# Patient Record
Sex: MALE | Race: WHITE | NOT HISPANIC OR LATINO | Employment: OTHER | URBAN - NONMETROPOLITAN AREA
[De-identification: names, ages, dates, MRNs, and addresses within clinical notes are randomized per-mention and may not be internally consistent; named-entity substitution may affect disease eponyms.]

---

## 2018-10-22 ENCOUNTER — OFFICE VISIT CONVERTED (OUTPATIENT)
Dept: FAMILY MEDICINE CLINIC | Age: 78
End: 2018-10-22
Attending: FAMILY MEDICINE

## 2018-11-06 ENCOUNTER — OFFICE VISIT CONVERTED (OUTPATIENT)
Dept: FAMILY MEDICINE CLINIC | Age: 78
End: 2018-11-06
Attending: FAMILY MEDICINE

## 2018-12-18 ENCOUNTER — OFFICE VISIT CONVERTED (OUTPATIENT)
Dept: FAMILY MEDICINE CLINIC | Age: 78
End: 2018-12-18
Attending: FAMILY MEDICINE

## 2019-01-08 ENCOUNTER — OFFICE VISIT CONVERTED (OUTPATIENT)
Dept: FAMILY MEDICINE CLINIC | Age: 79
End: 2019-01-08
Attending: FAMILY MEDICINE

## 2019-01-28 ENCOUNTER — CONVERSION ENCOUNTER (OUTPATIENT)
Dept: SURGERY | Facility: CLINIC | Age: 79
End: 2019-01-28

## 2019-01-28 ENCOUNTER — OFFICE VISIT CONVERTED (OUTPATIENT)
Dept: SURGERY | Facility: CLINIC | Age: 79
End: 2019-01-28
Attending: UROLOGY

## 2019-02-01 ENCOUNTER — HOSPITAL ENCOUNTER (OUTPATIENT)
Dept: OTHER | Facility: HOSPITAL | Age: 79
Discharge: HOME | End: 2019-02-01

## 2019-02-01 LAB
CREAT BLD-MCNC: 1.6 MG/DL (ref 0.6–1.4)
GFR SERPLBLD BASED ON 1.73 SQ M-ARVRAT: 40 ML/MIN/{1.73_M2}

## 2019-02-02 LAB
ANION GAP SERPL CALC-SCNC: 14 MMOL/L (ref 8–19)
BUN SERPL-MCNC: 13 MG/DL (ref 5–25)
BUN/CREAT SERPL: 10 {RATIO} (ref 6–20)
CALCIUM SERPL-MCNC: 9.3 MG/DL (ref 8.7–10.4)
CHLORIDE SERPL-SCNC: 104 MMOL/L (ref 99–111)
CONV CO2: 27 MMOL/L (ref 22–32)
CREAT UR-MCNC: 1.33 MG/DL (ref 0.7–1.2)
GFR SERPLBLD BASED ON 1.73 SQ M-ARVRAT: 50 ML/MIN/{1.73_M2}
GLUCOSE SERPL-MCNC: 90 MG/DL (ref 70–99)
OSMOLALITY SERPL CALC.SUM OF ELEC: 292 MOSM/KG (ref 273–304)
POTASSIUM SERPL-SCNC: 4.2 MMOL/L (ref 3.5–5.3)
SODIUM SERPL-SCNC: 141 MMOL/L (ref 135–147)

## 2019-02-08 ENCOUNTER — OFFICE VISIT CONVERTED (OUTPATIENT)
Dept: SURGERY | Facility: CLINIC | Age: 79
End: 2019-02-08
Attending: UROLOGY

## 2019-07-05 ENCOUNTER — OFFICE VISIT CONVERTED (OUTPATIENT)
Dept: FAMILY MEDICINE CLINIC | Age: 79
End: 2019-07-05
Attending: NURSE PRACTITIONER

## 2019-07-12 ENCOUNTER — HOSPITAL ENCOUNTER (OUTPATIENT)
Dept: OTHER | Facility: HOSPITAL | Age: 79
Discharge: HOME | End: 2019-07-12
Attending: FAMILY MEDICINE

## 2019-07-12 ENCOUNTER — OFFICE VISIT CONVERTED (OUTPATIENT)
Dept: FAMILY MEDICINE CLINIC | Age: 79
End: 2019-07-12
Attending: FAMILY MEDICINE

## 2019-07-12 LAB
ALBUMIN SERPL-MCNC: 3.9 G/DL (ref 3.5–5)
ALBUMIN/GLOB SERPL: 1.4 {RATIO} (ref 1.4–2.6)
ALP SERPL-CCNC: 73 U/L (ref 56–155)
ALT SERPL-CCNC: 8 U/L (ref 10–40)
AMYLASE SERPL-CCNC: 211 U/L (ref 30–150)
ANION GAP SERPL CALC-SCNC: 20 MMOL/L (ref 8–19)
APPEARANCE UR: CLEAR
AST SERPL-CCNC: 14 U/L (ref 15–50)
BACTERIA UR QL AUTO: NORMAL
BASOPHILS # BLD MANUAL: 0.07 10*3/UL (ref 0–0.2)
BASOPHILS NFR BLD MANUAL: 1.3 % (ref 0–3)
BILIRUB SERPL-MCNC: 0.32 MG/DL (ref 0.2–1.3)
BILIRUB UR QL: NEGATIVE
BUN SERPL-MCNC: 11 MG/DL (ref 5–25)
BUN/CREAT SERPL: 8 {RATIO} (ref 6–20)
CALCIUM SERPL-MCNC: 9.1 MG/DL (ref 8.7–10.4)
CASTS URNS QL MICRO: NORMAL /[LPF]
CHLORIDE SERPL-SCNC: 101 MMOL/L (ref 99–111)
COLOR UR: YELLOW
CONV CO2: 23 MMOL/L (ref 22–32)
CONV LEUKOCYTE ESTERASE: NEGATIVE
CONV TOTAL PROTEIN: 6.6 G/DL (ref 6.3–8.2)
CONV UROBILINOGEN IN URINE BY AUTOMATED TEST STRIP: 0.2 {EHRLICHU}/DL (ref 0.1–1)
CREAT BLD-MCNC: 1.4 MG/DL (ref 0.6–1.4)
CREAT UR-MCNC: 1.34 MG/DL (ref 0.7–1.2)
DEPRECATED RDW RBC AUTO: 40 FL
EOSINOPHIL # BLD MANUAL: 0.05 10*3/UL (ref 0–0.7)
EOSINOPHIL NFR BLD MANUAL: 0.9 % (ref 0–7)
EPI CELLS #/AREA URNS HPF: NORMAL /[HPF]
ERYTHROCYTE [DISTWIDTH] IN BLOOD BY AUTOMATED COUNT: 12 % (ref 11.5–14.5)
GFR SERPLBLD BASED ON 1.73 SQ M-ARVRAT: 47 ML/MIN/{1.73_M2}
GFR SERPLBLD BASED ON 1.73 SQ M-ARVRAT: 50 ML/MIN/{1.73_M2}
GLOBULIN UR ELPH-MCNC: 2.7 G/DL (ref 2–3.5)
GLUCOSE 24H UR-MCNC: NEGATIVE MG/DL
GLUCOSE SERPL-MCNC: 90 MG/DL (ref 70–99)
GRANS (ABSOLUTE): 3.32 10*3/UL (ref 2–8)
GRANS: 60.5 % (ref 30–85)
HBA1C MFR BLD: 12.6 G/DL (ref 14–18)
HCT VFR BLD AUTO: 38.5 % (ref 42–52)
HGB UR QL STRIP: NEGATIVE
IMM GRANULOCYTES # BLD: 0.01 10*3/UL (ref 0–0.54)
IMM GRANULOCYTES NFR BLD: 0.2 % (ref 0–0.43)
KETONES UR QL STRIP: NEGATIVE MG/DL
LYMPHOCYTES # BLD MANUAL: 1.62 10*3/UL (ref 1–5)
LYMPHOCYTES NFR BLD MANUAL: 7.5 % (ref 3–10)
MCH RBC QN AUTO: 29.5 PG (ref 27–31)
MCHC RBC AUTO-ENTMCNC: 32.7 G/DL (ref 33–37)
MCV RBC AUTO: 90.2 FL (ref 80–96)
MONOCYTES # BLD AUTO: 0.41 10*3/UL (ref 0.2–1.2)
MUCOUS THREADS URNS QL MICRO: NORMAL
NITRITE UR-MCNC: NEGATIVE MG/ML
OSMOLALITY SERPL CALC.SUM OF ELEC: 289 MOSM/KG (ref 273–304)
PH UR STRIP.AUTO: 7.5 [PH] (ref 5–8)
PLATELET # BLD AUTO: 201 10*3/UL (ref 130–400)
PMV BLD AUTO: 10.5 FL (ref 7.4–10.4)
POTASSIUM SERPL-SCNC: 4.3 MMOL/L (ref 3.5–5.3)
PROT UR-MCNC: NEGATIVE MG/DL
PSA SERPL-MCNC: 2.57 NG/ML (ref 0–4)
RBC # BLD AUTO: 4.27 10*6/UL (ref 4.7–6.1)
RBC # BLD AUTO: NORMAL /[HPF]
SODIUM SERPL-SCNC: 140 MMOL/L (ref 135–147)
SP GR UR STRIP: 1.01 (ref 1–1.03)
SPECIMEN SOURCE: NORMAL
UNIDENT CRYS URNS QL MICRO: NORMAL /[HPF]
VARIANT LYMPHS NFR BLD MANUAL: 29.6 % (ref 20–45)
WBC # BLD AUTO: 5.48 10*3/UL (ref 4.8–10.8)
WBC #/AREA URNS HPF: NORMAL /[HPF]

## 2020-01-14 ENCOUNTER — OFFICE VISIT CONVERTED (OUTPATIENT)
Dept: FAMILY MEDICINE CLINIC | Age: 80
End: 2020-01-14
Attending: FAMILY MEDICINE

## 2020-01-14 ENCOUNTER — HOSPITAL ENCOUNTER (OUTPATIENT)
Dept: OTHER | Facility: HOSPITAL | Age: 80
Discharge: HOME | End: 2020-01-14
Attending: FAMILY MEDICINE

## 2020-01-14 LAB
ALBUMIN SERPL-MCNC: 3.9 G/DL (ref 3.5–5)
ALBUMIN/GLOB SERPL: 1.5 {RATIO} (ref 1.4–2.6)
ALP SERPL-CCNC: 66 U/L (ref 56–155)
ALT SERPL-CCNC: 9 U/L (ref 10–40)
ANION GAP SERPL CALC-SCNC: 14 MMOL/L (ref 8–19)
AST SERPL-CCNC: 14 U/L (ref 15–50)
BILIRUB SERPL-MCNC: 0.35 MG/DL (ref 0.2–1.3)
BUN SERPL-MCNC: 13 MG/DL (ref 5–25)
BUN/CREAT SERPL: 10 {RATIO} (ref 6–20)
CALCIUM SERPL-MCNC: 9.4 MG/DL (ref 8.7–10.4)
CHLORIDE SERPL-SCNC: 103 MMOL/L (ref 99–111)
CONV CO2: 27 MMOL/L (ref 22–32)
CONV TOTAL PROTEIN: 6.5 G/DL (ref 6.3–8.2)
CREAT UR-MCNC: 1.34 MG/DL (ref 0.7–1.2)
ERYTHROCYTE [DISTWIDTH] IN BLOOD BY AUTOMATED COUNT: 12.4 % (ref 11.5–14.5)
FERRITIN SERPL-MCNC: 235 NG/ML (ref 30–300)
FOLATE SERPL-MCNC: 7.4 NG/ML (ref 4.8–20)
GFR SERPLBLD BASED ON 1.73 SQ M-ARVRAT: 50 ML/MIN/{1.73_M2}
GLOBULIN UR ELPH-MCNC: 2.6 G/DL (ref 2–3.5)
GLUCOSE SERPL-MCNC: 94 MG/DL (ref 70–99)
HBA1C MFR BLD: 12.3 G/DL (ref 14–18)
HCT VFR BLD AUTO: 38.2 % (ref 42–52)
IRON SERPL-MCNC: 84 UG/DL (ref 70–180)
MCH RBC QN AUTO: 29.9 PG (ref 27–31)
MCHC RBC AUTO-ENTMCNC: 32.2 G/DL (ref 33–37)
MCV RBC AUTO: 92.7 FL (ref 80–96)
OSMOLALITY SERPL CALC.SUM OF ELEC: 290 MOSM/KG (ref 273–304)
PLATELET # BLD AUTO: 215 10*3/UL (ref 130–400)
PMV BLD AUTO: 10.1 FL (ref 7.4–10.4)
POTASSIUM SERPL-SCNC: 4.2 MMOL/L (ref 3.5–5.3)
RBC # BLD AUTO: 4.12 10*6/UL (ref 4.7–6.1)
SODIUM SERPL-SCNC: 140 MMOL/L (ref 135–147)
VIT B12 SERPL-MCNC: 686 PG/ML (ref 211–911)
WBC # BLD AUTO: 5.19 10*3/UL (ref 4.8–10.8)

## 2020-11-24 ENCOUNTER — OFFICE VISIT CONVERTED (OUTPATIENT)
Dept: FAMILY MEDICINE CLINIC | Age: 80
End: 2020-11-24
Attending: FAMILY MEDICINE

## 2020-11-24 ENCOUNTER — HOSPITAL ENCOUNTER (OUTPATIENT)
Dept: OTHER | Facility: HOSPITAL | Age: 80
Discharge: HOME | End: 2020-11-24
Attending: FAMILY MEDICINE

## 2020-11-24 LAB
ERYTHROCYTE [DISTWIDTH] IN BLOOD BY AUTOMATED COUNT: 12.1 % (ref 11.5–14.5)
HBA1C MFR BLD: 12.2 G/DL (ref 14–18)
HCT VFR BLD AUTO: 37.6 % (ref 42–52)
MCH RBC QN AUTO: 30 PG (ref 27–31)
MCHC RBC AUTO-ENTMCNC: 32.4 G/DL (ref 33–37)
MCV RBC AUTO: 92.4 FL (ref 80–96)
PLATELET # BLD AUTO: 203 10*3/UL (ref 130–400)
PMV BLD AUTO: 10 FL (ref 7.4–10.4)
RBC # BLD AUTO: 4.07 10*6/UL (ref 4.7–6.1)
WBC # BLD AUTO: 5.75 10*3/UL (ref 4.8–10.8)

## 2020-11-24 PROCEDURE — 82746 ASSAY OF FOLIC ACID SERUM: CPT

## 2020-11-25 LAB
ALBUMIN SERPL-MCNC: 3.9 G/DL (ref 3.5–5)
ALBUMIN/GLOB SERPL: 1.4 {RATIO} (ref 1.4–2.6)
ALP SERPL-CCNC: 79 U/L (ref 56–155)
ALT SERPL-CCNC: 8 U/L (ref 10–40)
ANION GAP SERPL CALC-SCNC: 13 MMOL/L (ref 8–19)
AST SERPL-CCNC: 15 U/L (ref 15–50)
BILIRUB SERPL-MCNC: 0.34 MG/DL (ref 0.2–1.3)
BUN SERPL-MCNC: 12 MG/DL (ref 5–25)
BUN/CREAT SERPL: 8 {RATIO} (ref 6–20)
CALCIUM SERPL-MCNC: 9.2 MG/DL (ref 8.7–10.4)
CHLORIDE SERPL-SCNC: 99 MMOL/L (ref 99–111)
CONV CO2: 27 MMOL/L (ref 22–32)
CONV TOTAL PROTEIN: 6.6 G/DL (ref 6.3–8.2)
CREAT UR-MCNC: 1.52 MG/DL (ref 0.7–1.2)
GFR SERPLBLD BASED ON 1.73 SQ M-ARVRAT: 42 ML/MIN/{1.73_M2}
GLOBULIN UR ELPH-MCNC: 2.7 G/DL (ref 2–3.5)
GLUCOSE SERPL-MCNC: 89 MG/DL (ref 70–99)
OSMOLALITY SERPL CALC.SUM OF ELEC: 279 MOSM/KG (ref 273–304)
POTASSIUM SERPL-SCNC: 3.7 MMOL/L (ref 3.5–5.3)
SODIUM SERPL-SCNC: 135 MMOL/L (ref 135–147)
VIT B12 SERPL-MCNC: 605 PG/ML (ref 211–911)

## 2020-11-26 ENCOUNTER — LAB REQUISITION (OUTPATIENT)
Dept: LAB | Facility: HOSPITAL | Age: 80
End: 2020-11-26

## 2020-11-26 DIAGNOSIS — Z00.00 ROUTINE GENERAL MEDICAL EXAMINATION AT A HEALTH CARE FACILITY: ICD-10-CM

## 2020-11-26 LAB — FOLATE SERPL-MCNC: 7.98 NG/ML (ref 4.78–24.2)

## 2021-03-03 ENCOUNTER — HOSPITAL ENCOUNTER (OUTPATIENT)
Dept: OTHER | Facility: HOSPITAL | Age: 81
Discharge: HOME | End: 2021-03-03
Attending: FAMILY MEDICINE

## 2021-03-03 LAB
ANION GAP SERPL CALC-SCNC: 13 MMOL/L (ref 8–19)
APPEARANCE UR: ABNORMAL
BILIRUB UR QL: NEGATIVE
BUN SERPL-MCNC: 12 MG/DL (ref 5–25)
BUN/CREAT SERPL: 8 {RATIO} (ref 6–20)
CALCIUM SERPL-MCNC: 9.1 MG/DL (ref 8.7–10.4)
CHLORIDE SERPL-SCNC: 106 MMOL/L (ref 99–111)
COLOR UR: YELLOW
CONV BACTERIA: NEGATIVE
CONV CO2: 26 MMOL/L (ref 22–32)
CONV COLLECTION SOURCE (UA): ABNORMAL
CONV HYALINE CASTS IN URINE MICRO: ABNORMAL /[LPF]
CONV UROBILINOGEN IN URINE BY AUTOMATED TEST STRIP: 0.2 {EHRLICHU}/DL (ref 0.1–1)
CREAT UR-MCNC: 1.52 MG/DL (ref 0.7–1.2)
ERYTHROCYTE [DISTWIDTH] IN BLOOD BY AUTOMATED COUNT: 12.5 % (ref 11.5–14.5)
FOLATE SERPL-MCNC: 13.2 NG/ML (ref 4.8–20)
GFR SERPLBLD BASED ON 1.73 SQ M-ARVRAT: 42 ML/MIN/{1.73_M2}
GLUCOSE SERPL-MCNC: 73 MG/DL (ref 70–99)
GLUCOSE UR QL: NEGATIVE MG/DL
HBA1C MFR BLD: 12.1 G/DL (ref 14–18)
HCT VFR BLD AUTO: 37.3 % (ref 42–52)
HGB UR QL STRIP: NEGATIVE
KETONES UR QL STRIP: NEGATIVE MG/DL
LEUKOCYTE ESTERASE UR QL STRIP: NEGATIVE
MCH RBC QN AUTO: 30.4 PG (ref 27–31)
MCHC RBC AUTO-ENTMCNC: 32.4 G/DL (ref 33–37)
MCV RBC AUTO: 93.7 FL (ref 80–96)
NITRITE UR QL STRIP: NEGATIVE
OSMOLALITY SERPL CALC.SUM OF ELEC: 290 MOSM/KG (ref 273–304)
PH UR STRIP.AUTO: 5.5 [PH] (ref 5–8)
PLATELET # BLD AUTO: 185 10*3/UL (ref 130–400)
PMV BLD AUTO: 10 FL (ref 7.4–10.4)
POTASSIUM SERPL-SCNC: 3.8 MMOL/L (ref 3.5–5.3)
PROT UR QL: ABNORMAL MG/DL
RBC # BLD AUTO: 3.98 10*6/UL (ref 4.7–6.1)
RBC #/AREA URNS HPF: ABNORMAL /[HPF]
SODIUM SERPL-SCNC: 141 MMOL/L (ref 135–147)
SP GR UR: 1.02 (ref 1–1.03)
VIT B12 SERPL-MCNC: 358 PG/ML (ref 211–911)
WBC # BLD AUTO: 5.28 10*3/UL (ref 4.8–10.8)
WBC #/AREA URNS HPF: ABNORMAL /[HPF]

## 2021-03-16 ENCOUNTER — HOSPITAL ENCOUNTER (OUTPATIENT)
Dept: OTHER | Facility: HOSPITAL | Age: 81
Discharge: HOME | End: 2021-03-16
Attending: FAMILY MEDICINE

## 2021-05-16 VITALS — WEIGHT: 145 LBS | HEIGHT: 70 IN | RESPIRATION RATE: 12 BRPM | BODY MASS INDEX: 20.76 KG/M2

## 2021-05-16 VITALS — RESPIRATION RATE: 12 BRPM | HEIGHT: 70 IN | BODY MASS INDEX: 20.78 KG/M2 | WEIGHT: 145.12 LBS

## 2021-05-18 NOTE — PROGRESS NOTES
Teofilo Gomez 1940     Office/Outpatient Visit    Visit Date: Mon, Oct 22, 2018 10:42 am    Provider: Mohinder Hutchison MD (Assistant: Whit Curran)    Location: [facility]        Electronically signed by Mohinder Hutchison MD on  10/22/2018 05:28:44 PM                             SUBJECTIVE:        CC: questions about memory         HPI:     Francisco is in today for follow up on his memory.  He and his wife have noted some changes in his ability to recall things.  Specifically, he has missed some appointments because of his memory.  His wife has noted that he may forget to pick things up or do things that she may have asked him to do.  He has not had any difficulty driving.  He does continue to fly airplanes.  He has not had any problem paying bills.  His mother apparently did have 'a light case' of Alzheimer's disease.  Francisco does find that he is taking more notes and writes things down more than he used to.     ROS:     CONSTITUTIONAL:  Negative for chills and fever.      CARDIOVASCULAR:  Negative for chest pain and palpitations.      RESPIRATORY:  Negative for recent cough and dyspnea.      GASTROINTESTINAL:  Negative for abdominal pain, nausea and vomiting.      INTEGUMENTARY:  Negative for atypical mole(s) and rash.          [facility]/SH:     Last Reviewed on 10/22/2018 11:10 AM by Mohinder Hutchison    Past Medical History:             PAST MEDICAL HISTORY     UNREMARKABLE         Surgical History:         Tonsillectomy      R Shoulder/R Knee;         Family History:     Father:  at age early 80s     Mother:  at age late 80s         Social History:     Occupation: Retired (Prior occupation: self employed - )     Marital Status:      Children: 1 child         Tobacco/Alcohol/Supplements:     Last Reviewed on 10/22/2018 11:10 AM by Mohinder Hutchison    Tobacco: He has never smoked.          Alcohol:  Does not drink alcohol and never has.          Substance  Abuse History:     Last Reviewed on 10/22/2018 11:10 AM by Mohinder Hutchison        Mental Health History:     Last Reviewed on 10/22/2018 11:10 AM by Mohinder Hutchison        Communicable Diseases (eg STDs):     Last Reviewed on 10/22/2018 11:10 AM by Mohinder Hutchison            Current Problems:     Last Reviewed on 10/22/2018 11:10 AM by Mohinder Hutchison      None Recorded         Immunizations:     None        Allergies:     Last Reviewed on 10/22/2018 11:10 AM by Mohinder Hutchison      No Known Drug Allergies.         Current Medications:     Last Reviewed on 10/22/2018 11:10 AM by Mohinder Hutchison    None        OBJECTIVE:        Vitals:         Current: 10/22/2018 10:46:58 AM    Ht:  5 ft, 8 in;  Wt: 146.8 lbs;  BMI: 22.3    T: 97.5 F (oral);  BP: 141/78 mm Hg (right arm, sitting);  P: 67 bpm (right arm (BP Cuff), sitting)        Exams:     PHYSICAL EXAM:     GENERAL: Vitals recorded well developed, well nourished;     EYES: extraocular movements intact; conjunctiva and cornea are normal; PERRL;     E/N/T: EARS:  normal external auditory canals and tympanic membranes;  grossly normal hearing; OROPHARYNX:  normal mucosa, dentition, gingiva, and posterior pharynx;     NECK: range of motion is normal; thyroid is non-palpable;     RESPIRATORY: normal respiratory rate and pattern with no distress; normal breath sounds with no rales, rhonchi, wheezes or rubs;     CARDIOVASCULAR: normal rate; rhythm is regular;  no systolic murmur;     GASTROINTESTINAL: nontender; normal bowel sounds; no masses;     LYMPHATIC: no enlargement of cervical or facial nodes; no supraclavicular nodes;     SKIN:  no significant rashes or lesions; no suspicious moles;     NEUROLOGIC: mental status: alert and oriented x 3; cranial nerves II-XII grossly intact; Mini-Mental State Exam score is 27 - all on short term recall     PSYCHIATRIC: appropriate affect and demeanor; normal psychomotor function;          ASSESSMENT           780.93   R41.89  Memory loss              DDx:         ORDERS:         Lab Orders:       05689  B12FO - [facility] Vitamin B12 with Folate  (Send-Out)         02530  BDCB2 - [facility] CBC w/o diff  (Send-Out)         38521  COMP - [facility] Comp. Metabolic Panel  (Send-Out)         65976  TSH - [facility] TSH  (Send-Out)                   PLAN:          Memory loss     LABORATORY:  Labs ordered to be performed today include B12 with Folate, CBC W/O DIFF, Comprehensive metabolic panel, and TSH.      RECOMMENDATIONS given include: Today, we have reviewed Francisco's care.  I'm concerned about the loss of memory that we have seen on testing and of which his wife is concerned.  We are going to evaluate him further as noted below.  I suspect his blood work will be normal.  The question will be whether to consider medication for him at some point.  I have given him a handout regarding dementia.  We will go from there.  No other near term changes are anticipated..            Orders:       81950  B12FO - [facility] Vitamin B12 with Folate  (Send-Out)         37378  BDCB2 - [facility] CBC w/o diff  (Send-Out)         51073  COMP - [facility] Comp. Metabolic Panel  (Send-Out)         97638  TSH - [facility] TSH  (Send-Out)             Patient Education Handouts:       Alzheimer's Disease              CHARGE CAPTURE           **Please note: ICD descriptions below are intended for billing purposes only and may not represent clinical diagnoses**        Primary Diagnosis:         780.93 Memory loss            R41.89    Other symptoms and signs involving cognitive functions and awareness              Orders:          24557   Office visit - new pt, level 3  (In-House)

## 2021-05-18 NOTE — PROGRESS NOTES
Teofilo Gomez 1940     Office/Outpatient Visit    Visit Date: Fri, Jul 5, 2019 04:10 pm    Provider: German Reyes N.P. (Assistant: Frida Magaña MA)    Location: [facility]        Electronically signed by German Reyes N.P. on  07/05/2019 06:27:42 PM                             SUBJECTIVE:        CC:     Frnacisco is a 78 year old White male.  presents today due to nausea and headache x 3 days         HPI:         PHQ-9 Depression Screening: Completed form scanned and in chart; Total Score 2 Alcohol Consumption Screening: Completed form scanned and in chart; Total Score 0         With regard to the headache, onset was 2 days ago.  The location is primarily occipital.  It does not radiate.  Francisco denies having significant prior headaches.  He characterizes it as moderate in severity and aching.  Associated symptoms include nausea.  He denies altered consciousness, confusion, fever, pain in teeth, phonophobia, photophobia, seizure, stiff neck, vision disturbance, vertigo or vomiting.  There do not seem to be any factors that worsen the headache.  It is improved with has not taken anything for the Headache, not even Tylenol or Ibuprofen.  Pertinent past medical history includes Anemia and Memory loss.  Denies change in LOC or vision. Did get new glasses last week [facility]      ROS:     CONSTITUTIONAL:  Negative for chills, fatigue and fever.      CARDIOVASCULAR:  Negative for chest pain, orthopnea, paroxysmal nocturnal dyspnea and pedal edema.      RESPIRATORY:  Negative for dyspnea and cough.      GASTROINTESTINAL:  Positive for nausea.   Negative for abdominal pain, acid reflux symptoms, constipation, diarrhea or vomiting.      NEUROLOGICAL:  Positive for headaches and memory loss.   Negative for dizziness, fainting, paresthesias, seizures, tremor, vertigo or weakness.      PSYCHIATRIC:  Negative for anxiety and depression.          PMH/FMH/SH:     Last Reviewed on 7/05/2019 04:59  PM by German Reyes    Past Medical History:             PAST MEDICAL HISTORY         Alzheimer's Disease         PREVENTIVE HEALTH MAINTENANCE             COLORECTAL CANCER SCREENING: Up to date (colonoscopy q10y; sigmoidoscopy q5y; Cologuard q3y) was last done 10/2016, Results are in chart; colonoscopy with the following abnormalities noted-- Diverticulosis         Surgical History:         Tonsillectomy      R Shoulder/R Knee;         Family History:     Father:  at age early 80s     Mother:  at age late 80s;  Alzheimer's Disease         Social History:     Occupation: Retired (Prior occupation: self employed - )     Marital Status:      Children: 1 child         Tobacco/Alcohol/Supplements:     Last Reviewed on 2019 04:59 PM by German Reyes    Tobacco: He has never smoked.          Alcohol:  Does not drink alcohol and never has.          Substance Abuse History:     Last Reviewed on 2019 04:59 PM by German Reyes        Mental Health History:     Last Reviewed on 2019 04:59 PM by German Reyes        Communicable Diseases (eg STDs):     Last Reviewed on 2019 04:59 PM by German Reyes            Current Problems:     Last Reviewed on 2019 04:59 PM by German Reyes    Pernicious anemia     Memory loss     Headache     Screening for depression     Nonspecific abnormality on kidney function test         Immunizations:     None        Allergies:     Last Reviewed on 2019 04:59 PM by German Reyes      No Known Drug Allergies.         Current Medications:     Last Reviewed on 2019 04:59 PM by German Reyes    Polysaccharide Iron Complex 150mg Capsules Take 1 capsule(s) by mouth daily     Vitamin B12 1,000mcg/1ml Injection 1 cc weekly x 4 weeks then monthly     Donepezil HCl 10mg Tablet Take 1 tablet(s) by mouth daily         OBJECTIVE:        Vitals:         Current: 2019 4:15:25 PM    Ht:  5 ft, 8 in;  Wt:  146.8 lbs;  BMI: 22.3    T: 98.2 F (oral);  BP: 140/75 mm Hg (left arm, sitting);  P: 64 bpm (left arm (BP Cuff), sitting);  sCr: 1.45 mg/dL;  GFR: 36.29        Exams:     PHYSICAL EXAM:     GENERAL: Vitals recorded well developed, well nourished;  well groomed;  no apparent distress;     EYES: lids and lacrimal system are normal in appearance; extraocular movements intact; conjunctiva and cornea are normal; PERRLA;     E/N/T:  normal EACs, TMs, nasal/oral mucosa, teeth, gingiva, and oropharynx;     NECK: carotid exam is normal with good upstroke and no bruits;     RESPIRATORY: normal respiratory rate and pattern with no distress; normal breath sounds with no rales, rhonchi, wheezes or rubs;     CARDIOVASCULAR: normal rate; rhythm is regular;  normal S1; normal S2; no systolic murmur; no cyanosis; no edema;     GASTROINTESTINAL: nontender, nondistended; no hepatosplenomegaly or masses; no bruits;     SKIN:  no significant rashes or lesions; no suspicious moles;     MUSCULOSKELETAL:  Normal range of motion, strength and tone;     NEUROLOGIC: mental status: oriented to person and place only;  cranial nerves II-XII grossly intact; reflexes: knee jerks: 2+;  coordination/cerebellar: normal finger-to-nose;  normal heel-to-shin;  normal rapid alternating movements;  negative Romberg;     PSYCHIATRIC:  appropriate affect and demeanor; normal speech pattern; grossly normal memory;         Lab/Test Results:             BUN:  14 (mg/dl) (12/18/2018),     Creatinine, Serum:  1.45 (mg/dl) (12/18/2018),     Glom Filt Rate, Est:  45 (ml/min/1.73m2) (12/18/2018),             ASSESSMENT:           V79.0   Z13.89  Screening for depression              DDx:     784.0   B35.3  Headache              DDx:         ORDERS:         Meds Prescribed:       Ibuprofen 600mg Tablet Take 1 tablet(s) by mouth q 4 to 6 hr prn  #30 (Thirty) tablet(s) Refills: 0       Zofran (Ondansetron HCl) 4mg Tablet 1 po q 6 hours prn  #20 (Twenty) tablet(s)  Refills: 0         Radiology/Test Orders:       [id number]  Computed tomography, head or brain; without contrast material  (Send-Out)           Other Orders:         Depression screen negative  (In-House)           Negative EtOH screen  (In-House)                   PLAN:          Screening for depression     MIPS PHQ-9 Depression Screening: Completed form scanned and in chart; Total Score 2; Negative Depression Screen Negative alcohol screen           Orders:         Depression screen negative  (In-House)           Negative EtOH screen  (In-House)            Headache         FOLLOW-UP TESTING #1:    RADIOLOGY:  I have ordered a head CT w/out contrast to be done today.            Prescriptions:       Ibuprofen 600mg Tablet Take 1 tablet(s) by mouth q 4 to 6 hr prn  #30 (Thirty) tablet(s) Refills: 0       Zofran (Ondansetron HCl) 4mg Tablet 1 po q 6 hours prn  #20 (Twenty) tablet(s) Refills: 0           Orders:       77796  Computed tomography, head or brain; without contrast material  (Send-Out)   CT Head with and without contrast, new onset Headache. dmt             CHARGE CAPTURE:           Primary Diagnosis:     V79.0 Screening for depression            Z13.89    Encounter for screening for other disorder              Orders:          12762   Office/outpatient visit; established patient, level 4  (In-House)                Depression screen negative  (In-House)                Negative EtOH screen  (In-House)           784.0 Headache            B35.3    Tinea pedis        ADDENDUMS:      ____________________________________    Date: 07/12/2019 08:54 AM    Author: Melissa Rios         Radiology Orders Faxed to:             Date: 07/12/2019 08:54 AM    Author: Melissa Rios         Radiology Orders Faxed to:        [facility]; Number [phone]

## 2021-05-18 NOTE — PROGRESS NOTES
Teofilo Gomez 1940     Office/Outpatient Visit    Visit Date:  01:50 pm    Provider: Mhoinder Hutchison MD (Assistant: Zelda Serrano RN)    Location: [facility]        Electronically signed by Mohinder Hutchison MD on  2019 05:21:06 PM                             SUBJECTIVE:        CC: 'I'm wanting to find out about this driving course'         HPI:     Francisco is in today for follow up on his memory.  He did see neurology for evaluation of this and there was apparently a concern about whether he should continue to drive.  Neurology suggested he have additional evaluation on this.  Francisco feels like he is capable of driving.  However, he was told that he had to have this driving evaluation done in order to keep driving.         Francisco does have history of some low level anemia and was noted to have B12 deficiency and an iron level that was a little bit low.  Recent blood work is noted and again reviewed.  His hemoglobin improved from 12.2 to 13.0.          Recent blood work and renal ultrasound is noted and briefly reviewed with him.     ROS:     CONSTITUTIONAL:  Negative for chills and fever.      CARDIOVASCULAR:  Negative for chest pain and palpitations.      RESPIRATORY:  Negative for recent cough and dyspnea.      GASTROINTESTINAL:  Negative for abdominal pain, nausea and vomiting.          PM/[facility]/:     Last Reviewed on 2018 09:26 AM by Mohinder Hutchison    Past Medical History:             PAST MEDICAL HISTORY         Alzheimer's Disease         PREVENTIVE HEALTH MAINTENANCE             COLORECTAL CANCER SCREENING: Up to date (colonoscopy q10y; sigmoidoscopy q5y; Cologuard q3y) was last done 10/2016, Results are in chart; colonoscopy with the following abnormalities noted-- Diverticulosis         Surgical History:         Tonsillectomy      R Shoulder/R Knee;         Family History:     Father:  at age early 80s     Mother:  at age late 80s;   Alzheimer's Disease         Social History:     Occupation: Retired (Prior occupation: self employed - )     Marital Status:      Children: 1 child         Tobacco/Alcohol/Supplements:     Last Reviewed on 12/18/2018 09:26 AM by Mohinder Hutchison    Tobacco: He has never smoked.          Alcohol:  Does not drink alcohol and never has.          Substance Abuse History:     Last Reviewed on 12/18/2018 09:26 AM by Mohinder Hutchison        Mental Health History:     Last Reviewed on 12/18/2018 09:26 AM by Mohinder Hutchison        Communicable Diseases (eg STDs):     Last Reviewed on 12/18/2018 09:26 AM by Mohinder Hutchison            Current Problems:     Last Reviewed on 12/18/2018 09:26 AM by Mohinder Hutchison    Pernicious anemia     Memory loss     Nonspecific abnormality on kidney function test         Immunizations:     None        Allergies:     Last Reviewed on 1/08/2019 01:53 PM by Zelda Serrano      No Known Drug Allergies.         Current Medications:     Last Reviewed on 1/08/2019 01:53 PM by Zelda Serrano    Vitamin B12 1,000mcg/1ml Injection 1 cc weekly x 4 weeks then monthly     Polysaccharide Iron Complex 150mg Capsules Take 1 capsule(s) by mouth daily     Donepezil HCl 5mg Tablet Take 1 tablet(s) by mouth at bedtime         OBJECTIVE:        Vitals:         Current: 1/8/2019 1:56:19 PM    Ht:  5 ft, 8 in;  Wt: 143.6 lbs;  BMI: 21.8    T: 97.6 F (oral);  BP: 133/74 mm Hg (left arm, sitting);  P: 64 bpm (left arm (BP Cuff), sitting);  sCr: 1.45 mg/dL;  GFR: 35.96        Exams:     PHYSICAL EXAM:     GENERAL: Vitals recorded well developed, well nourished;     EYES: extraocular movements intact; conjunctiva and cornea are normal; PERRL;     E/N/T: EARS:  normal external auditory canals and tympanic membranes;  grossly normal hearing; OROPHARYNX:  normal mucosa, dentition, gingiva, and posterior pharynx;     NECK: range of motion is normal; thyroid is  non-palpable;     RESPIRATORY: normal respiratory rate and pattern with no distress; normal breath sounds with no rales, rhonchi, wheezes or rubs;     CARDIOVASCULAR: normal rate; rhythm is regular;  no systolic murmur;     GASTROINTESTINAL: nontender; normal bowel sounds; no masses;     SKIN:  no significant rashes or lesions; no suspicious moles;     NEUROLOGIC: mental status: alert;  cranial nerves II-XII grossly intact;         ASSESSMENT           780.93   R41.89  Memory loss              DDx:     281.0   D51.0  Pernicious anemia              DDx:     794.4   R94.4  Nonspecific abnormality on kidney function test              DDx:         ORDERS:         Meds Prescribed:       Refill of: Polysaccharide Iron Complex 150mg Capsules Take 1 capsule(s) by mouth daily  #90 (Ninety) capsule(s) Refills: 1         Radiology/Test Orders:       3017F  Colorectal CA screen results documented and reviewed (PV)  (In-House)                   PLAN:          Memory loss         RECOMMENDATIONS given include: Today, we have again reviewed Francisco's care.  We will contact Dr. Grullon's office regarding the memory and the recommendation regarding a driving evaluation.  I do think continuing the donepezil is reasonable.  Francisco is frustrated about the driving issue though, and I have told him that we will follow up regarding this and whether the test is necessary.  I suspect it is based on what they are telling me.  With regard to the anemia, I am not recommending any changes for now..  MIPS Vaccines Flu and Pneumonia updated in Shot record     COLORECTAL CANCER SCREENING: Results are in chart           Orders:       3017F  Colorectal CA screen results documented and reviewed (PV)  (In-House)             Patient Education Handouts:       Alzheimer's Disease           Pernicious anemia As above.           Prescriptions:       Refill of: Polysaccharide Iron Complex 150mg Capsules Take 1 capsule(s) by mouth daily  #90 (Ninety)  capsule(s) Refills: 1          Nonspecific abnormality on kidney function test As above.  See urology as scheduled.             CHARGE CAPTURE           **Please note: ICD descriptions below are intended for billing purposes only and may not represent clinical diagnoses**        Primary Diagnosis:         780.93 Memory loss            R41.89    Other symptoms and signs involving cognitive functions and awareness              Orders:          86818   Office/outpatient visit; established patient, level 4  (In-House)             3017F   Colorectal CA screen results documented and reviewed (PV)  (In-House)           281.0 Pernicious anemia            D51.0    Vitamin B12 deficiency anemia due to intrinsic factor deficiency    794.4 Nonspecific abnormality on kidney function test            R94.4    Abnormal results of kidney function studies        ADDENDUMS:      ____________________________________    Addendum: 01/09/2019 09:51 AM - Mohinder Hutchison        Please let Francisco's wife know that I do recommend he have the driving evaluation that was recommended by Dr. Grullon.  As we discussed yesterday, Alzheimer's disease can lead to confusion while driving that can be dangerous to them and to others.  The folks at [facility] apparently have a detailed evaluation they do to reassure us and them that Francisco is or is not safe to drive.  Thanks.        Addendum: 01/09/2019 09:57 AM - Four, Team        pt's wife inf, states they will comply/th        Addendum: 01/09/2019 09:58 AM - Mohinder Hutchison        Noted.

## 2021-05-18 NOTE — PROGRESS NOTES
Teofilo Gomez 1940     Office/Outpatient Visit    Visit Date:  08:18 am    Provider: Mohinder Hutchison MD     Location: [facility]        Electronically signed by Mohinder Hutchison MD on  2018 05:24:55 PM                             SUBJECTIVE:        CC: memory loss, B12 deficiency         HPI:     Francisco is in today with his wife, Sara, for follow up on his memory.  Please see his most recent visit note.  He was noted to score 27/30 on his MMSE.  He had poor short term memory retention.  His wife has noted some issue with this for the last couple of months at least.  Prior to that, he seemed to be doing okay.  The first thing they noted was an issue with not recording written checks in their account register.  They actually overdrew the account.  There have also been some issues with driving in regard to looking for certain things.  He has not had recent MRI of the brain.  Francisco has noted some issue with some tingling and numbness on the L side of his arm.  He has not had any issue with speech or swallowing.         Francisco's labs are also reviewed.  He was noted to have B12 deficiency.  He is on an oral supplement at this time.     ROS:     CONSTITUTIONAL:  Negative for chills and fever.      CARDIOVASCULAR:  Negative for chest pain and palpitations.      RESPIRATORY:  Negative for recent cough and dyspnea.      GASTROINTESTINAL:  Negative for abdominal pain, nausea and vomiting.      INTEGUMENTARY:  Negative for atypical mole(s) and rash.          [facility]/:     Last Reviewed on 10/22/2018 11:10 AM by Mohinder Hutchison    Past Medical History:             PAST MEDICAL HISTORY     UNREMARKABLE         Surgical History:         Tonsillectomy      R Shoulder/R Knee;         Family History:     Father:  at age early 80s     Mother:  at age late 80s         Social History:     Occupation: Retired (Prior occupation: self employed - )     Marital  Status:      Children: 1 child         Tobacco/Alcohol/Supplements:     Last Reviewed on 10/22/2018 11:10 AM by Mohinder Hutchison    Tobacco: He has never smoked.          Alcohol:  Does not drink alcohol and never has.          Substance Abuse History:     Last Reviewed on 10/22/2018 11:10 AM by Mohinder Hutchison        Mental Health History:     Last Reviewed on 10/22/2018 11:10 AM by Mohinder Hutchison        Communicable Diseases (eg STDs):     Last Reviewed on 10/22/2018 11:10 AM by Mohinder Hutchison            Current Problems:     Last Reviewed on 10/22/2018 11:10 AM by Mohinder Hutchison    Memory loss         Immunizations:     None        Allergies:     Last Reviewed on 10/22/2018 11:10 AM by Mohinder Hutchison      No Known Drug Allergies.         Current Medications:     Last Reviewed on 10/22/2018 11:10 AM by Mohinder uHtchison    Polysaccharide Iron Complex 150mg Capsules Take 1 capsule(s) by mouth daily     Vitamin B12 500mcg Tablet 1 tab daily         OBJECTIVE:        Vitals:         Current: 11/6/2018 8:45:36 AM    Ht:  5 ft, 8 in;  Wt: 146.2 lbs;  BMI: 22.2    T: 98.1 F (oral);  BP: 126/73 mm Hg (right arm, sitting);  P: 68 bpm (right arm (BP Cuff), sitting);  sCr: 1.3 mg/dL;  GFR: 41.05        Exams:     PHYSICAL EXAM:     GENERAL: Vitals recorded well developed, well nourished;     EYES: extraocular movements intact; conjunctiva and cornea are normal; PERRL;     NECK: range of motion is normal; thyroid is non-palpable;     RESPIRATORY: normal respiratory rate and pattern with no distress; normal breath sounds with no rales, rhonchi, wheezes or rubs;     CARDIOVASCULAR: normal rate; rhythm is regular;  no systolic murmur;     GASTROINTESTINAL: nontender; normal bowel sounds; no masses;     SKIN:  no significant rashes or lesions; no suspicious moles;     NEUROLOGIC: mental status: alert and oriented x 3; cranial nerves II-XII grossly intact; there is not any  focal weakness;     PSYCHIATRIC: appropriate affect and demeanor;         Procedures:     Pernicious anemia     1. Vitamin B 12 1000 mcg/ml 1 ml given IM in the left upper arm; administered by [facility];  lot number [id number]; expires 3/20             ASSESSMENT           780.93   R41.89  Memory loss              DDx:     281.0   D51.0  Pernicious anemia              DDx:     782.0   R20.2  Paresthesia              DDx:         ORDERS:         Meds Prescribed:       Refill of: Vitamin B12 (Cyanocobalamin) 1,000mcg/1ml Injection 1 cc weekly x 4 weeks then monthly  #10 (Ten) vial(s) Refills: 3         Radiology/Test Orders:       73404  Computed tomography, head or brain; with contrast material(s)  (Send-Out)         65273  Computed tomography, head or brain; without contrast material  (Send-Out)         77556  Duplex scan of extracranial arteries; complete bilateral study  (Send-Out)           Other Orders:       94593  Therapeutic injection  (In-House)           B12 Injection  (In-House)                   PLAN:          Memory loss         RADIOLOGY:  I have ordered Carotid Ultrasound and a head CT w/ contrast w/out contrast to be done today.      RECOMMENDATIONS given include: Today, we have reviewed his care.  My feeling is that he probably has Alzheimer's disease developing.  Given the history, though, I do want to arrange additional testing.  We may consider some medication aimed primarily at Alzheimer's disease depending on what the testing shows.  No other changes for now.  We will start him on B12 injections.  His wife should be able to do those..            Orders:       56050  Computed tomography, head or brain; with contrast material(s)  (Send-Out)         95989  Computed tomography, head or brain; without contrast material  (Send-Out)         15937  Duplex scan of extracranial arteries; complete bilateral study  (Send-Out)             Patient Education Handouts:       Alzheimer's Disease            Pernicious anemia     Miscellaneous Vitamin B-12: 1000 mcg           Prescriptions:       Refill of: Vitamin B12 (Cyanocobalamin) 1,000mcg/1ml Injection 1 cc weekly x 4 weeks then monthly  #10 (Ten) vial(s) Refills: 3           Orders:       02270  Therapeutic injection  (In-House)           B12 Injection  (In-House)            Paresthesia As above.             CHARGE CAPTURE           **Please note: ICD descriptions below are intended for billing purposes only and may not represent clinical diagnoses**        Primary Diagnosis:         780.93 Memory loss            R41.89    Other symptoms and signs involving cognitive functions and awareness              Orders:          62966   Office/outpatient visit; established patient, level 4  (In-House)           281.0 Pernicious anemia            D51.0    Vitamin B12 deficiency anemia due to intrinsic factor deficiency              Orders:          94645   Therapeutic injection  (In-House)                B12 Injection  (In-House)           782.0 Paresthesia            R20.2    Paresthesia of skin        ADDENDUMS:      ____________________________________    Date: 11/15/2018 04:31 PM    Author: Susan Craig         Visit Note Faxed to:        Corey Grullon (Neurology); Number [phone]

## 2021-05-18 NOTE — PROGRESS NOTES
Teofilo Gomez  1940     Office/Outpatient Visit    Visit Date:  09:17 am    Provider: Mohinder Hutchison MD (Assistant: Mya Garrido MA)    Location: [facility]        Electronically signed by Mohinder Hutchison MD on  01/15/2020 05:18:03 AM                             Subjective:        CC: dementia, anemia    HPI:       Francisco is in today for follow up on dementia.  He has been doing well in the last few months.  He does continue to have memory issues.  His wife, however, assures me that he has significant memory issues.  He does remain on donepezil which was prescribed by neurology last year.  His wife has not really seen improvement with the memory.          He does have B12 deficiency for which he remains on injections once monthly.  He is due for repeat blood work for some anemia.  He also remains on a low dose of iron.    ROS:     CONSTITUTIONAL:  Negative for chills and fever.      CARDIOVASCULAR:  Negative for chest pain and palpitations.      RESPIRATORY:  Negative for recent cough and dyspnea.      GASTROINTESTINAL:  Negative for abdominal pain, nausea and vomiting.      INTEGUMENTARY:  Negative for atypical mole(s) and rash.          Past Medical History / Family History / Social History:         Last Reviewed on 2020 09:46 AM by Mohinder Hutchison    Past Medical History:             PAST MEDICAL HISTORY         Alzheimer's Disease             ADVANCED DIRECTIVES: None - Sara would make decisions for him if needed.         PREVENTIVE HEALTH MAINTENANCE             COLORECTAL CANCER SCREENING: Up to date (colonoscopy q10y; sigmoidoscopy q5y; Cologuard q3y) was last done 10/2016, Results are in chart; colonoscopy with the following abnormalities noted-- Diverticulosis         Surgical History:         Tonsillectomy     R Shoulder/R Knee;         Family History:     Father:  at age early 80s     Mother:  at age late 80s;  Alzheimer's Disease          Social History:     Occupation: Retired (Prior occupation: self employed - )     Marital Status:      Children: 1 child         Tobacco/Alcohol/Supplements:     Last Reviewed on 1/14/2020 09:46 AM by Mohinder Hutchison    Tobacco: He has never smoked.          Alcohol:  Does not drink alcohol and never has.          Substance Abuse History:     Last Reviewed on 1/14/2020 09:46 AM by Mohinder Hutchison        Mental Health History:     Last Reviewed on 1/14/2020 09:46 AM by Mohinder Hutchison        Communicable Diseases (eg STDs):     Last Reviewed on 1/14/2020 09:46 AM by Mohinder Hutchison        Current Problems:     Last Reviewed on 1/14/2020 09:46 AM by Mohinder Hutchison    Other symptoms and signs involving cognitive functions and awareness    Vitamin B12 deficiency anemia due to intrinsic factor deficiency    Abnormal results of kidney function studies    Benign prostatic hyperplasia without lower urinary tract symptoms        Immunizations:     None        Allergies:     Last Reviewed on 1/14/2020 09:46 AM by Mohinder Hutchison    No Known Allergies.        Current Medications:     Last Reviewed on 1/14/2020 09:46 AM by Mohinder Hutchison    Vitamin B12 1,000mcg/1ml Injection [1 cc weekly x 4 weeks then monthly]    Polysaccharide Iron Complex 150 mg iron oral capsule [Take 1 capsule(s) by mouth daily]    donepeziL 10 mg oral tablet [Take 1 tablet(s) by mouth daily]        Objective:        Vitals:         Current: 1/14/2020 9:22:14 AM    Ht:  5 ft, 8 in;  Wt: 146.2 lbs;  BMI: 22.2T: 98.2 F (oral);  BP: 124/64 mm Hg (left arm, sitting);  P: 65 bpm (left arm (BP Cuff), sitting);  sCr: 1.34 mg/dL;  GFR: 38.59        Exams:     PHYSICAL EXAM:     GENERAL: Vitals recorded well developed, well nourished;     EYES: extraocular movements intact; conjunctiva and cornea are normal; PERRL;     E/N/T: EARS:  normal external auditory canals and tympanic membranes;   grossly normal hearing; OROPHARYNX:  normal mucosa, dentition, gingiva, and posterior pharynx;     NECK: range of motion is normal; thyroid is non-palpable;     RESPIRATORY: normal respiratory rate and pattern with no distress; normal breath sounds with no rales, rhonchi, wheezes or rubs;     CARDIOVASCULAR: normal rate; rhythm is regular;  no systolic murmur;     GASTROINTESTINAL: nontender; normal bowel sounds; no masses;     LYMPHATIC: no enlargement of cervical or facial nodes; no supraclavicular nodes;     SKIN:  no significant rashes or lesions; no suspicious moles;     NEUROLOGIC: mental status: oriented to person and place only;  cranial nerves II-XII grossly intact;     PSYCHIATRIC: appropriate affect and demeanor; normal psychomotor function;         Assessment:         G30.1   Alzheimer's disease with late onset       D51.0   Vitamin B12 deficiency anemia due to intrinsic factor deficiency       R94.4   Abnormal results of kidney function studies           ORDERS:         Meds Prescribed:       [Refilled] Polysaccharide Iron Complex 150 mg iron oral capsule [Take 1 capsule(s) by mouth daily], #90 (ninety) capsules, Refills: 1 (one)       [New Rx] cyanocobalamin (vitamin B-12) 1,000 mcg/mL injection Solution [inject 1 milliliter (1,000 mcg) by intramuscular route once a month], #10 (ten) milliliters, Refills: 3 (three)         Radiology/Test Orders:       3017F  Colorectal CA screen results documented and reviewed (PV)  (In-House)              Lab Orders:       93180  B12FO - [facility] Vitamin B12 with Folate  (Send-Out)            58099  BDCB2 - [facility] CBC w/o diff  (Send-Out)            35753  FERR - [facility] Ferritin Serum  (Send-Out)            61293  IRON - [facility] Iron, serum  (Send-Out)            81620  COMP - [facility] Comp. Metabolic Panel  (Send-Out)              Other Orders:         Depression screen negative  (In-House)            1101F  Pt screen for fall risk; document no falls in past year or only 1 fall w/o  injury in past year (MOHSEN)  (In-House)                      Plan:         Alzheimer's disease with late onset        RECOMMENDATIONS given include: Today, we have reviewed Francisco's care.  I'm going to recommend no changes in his medications at this time.  We will refill needed medications and arrange follow up labs.  He did not follow through on testing as it pertains to driving.  I have advised that Francisco not drive until formal evaluation given the concerns.  We will try to arrange formal testing as a precaution..  MIPS PHQ-9 Depression Screening: Completed form scanned and in chart; Total Score 2; Negative Depression Screen           Prescriptions:       [Refilled] Polysaccharide Iron Complex 150 mg iron oral capsule [Take 1 capsule(s) by mouth daily], #90 (ninety) capsules, Refills: 1 (one)       [New Rx] cyanocobalamin (vitamin B-12) 1,000 mcg/mL injection Solution [inject 1 milliliter (1,000 mcg) by intramuscular route once a month], #10 (ten) milliliters, Refills: 3 (three)           Orders:         Depression screen negative  (In-House)            1101F  Pt screen for fall risk; document no falls in past year or only 1 fall w/o injury in past year (MOHSEN)  (In-House)            3017F  Colorectal CA screen results documented and reviewed (PV)  (In-House)                Patient Education Handouts:       Alzheimer's Disease          Vitamin B12 deficiency anemia due to intrinsic factor deficiency    LABORATORY:  Labs ordered to be performed today include B12 with Folate, CBC W/O DIFF, Ferritin Serum, and Iron Serum.            Orders:       14492  B12FO - [facility] Vitamin B12 with Folate  (Send-Out)            78096  BDCB2 - [facility] CBC w/o diff  (Send-Out)            24951  FERR - [facility] Ferritin Serum  (Send-Out)            93432  IRON - [facility] Iron, serum  (Send-Out)              Abnormal results of kidney function studies    LABORATORY:  Labs ordered to be performed today include Comprehensive metabolic panel.             Orders:       76135  COMP - [facility] Comp. Metabolic Panel  (Send-Out)                  Charge Capture:         Primary Diagnosis:     G30.1  Alzheimer's disease with late onset           Orders:      51883  Office/outpatient visit; established patient, level 4  (In-House)              Depression screen negative  (In-House)            1101F  Pt screen for fall risk; document no falls in past year or only 1 fall w/o injury in past year (MOHSEN)  (In-House)            3017F  Colorectal CA screen results documented and reviewed (PV)  (In-House)              D51.0  Vitamin B12 deficiency anemia due to intrinsic factor deficiency     R94.4  Abnormal results of kidney function studies

## 2021-05-18 NOTE — PROGRESS NOTES
Teofilo Gomez  1940     Office/Outpatient Visit    Visit Date:  02:22 pm    Provider: Mohinder Hutchison MD (Assistant: Erum Jimenez LPN)    Location: [facility]        Electronically signed by Mohinder Hutchison MD on  2020 09:23:03 AM                             Subjective:        CC: dementia, anemia    HPI:       Francisco is in today for follow up on dementia.  He has been taking Aricept for some time and tolerating that well.  His memory has been relatively stable.  His son notes that the memory does continue to be an issue for him.    ROS:     CONSTITUTIONAL:  Negative for chills and fever.      CARDIOVASCULAR:  Negative for chest pain and palpitations.      RESPIRATORY:  Negative for recent cough and dyspnea.      GASTROINTESTINAL:  Negative for abdominal pain, nausea and vomiting.      INTEGUMENTARY:  Negative for atypical mole(s) and rash.          Past Medical History / Family History / Social History:         Last Reviewed on 2020 02:43 PM by Mohinder Hutchison    Past Medical History:             PAST MEDICAL HISTORY         Alzheimer's Disease             ADVANCED DIRECTIVES: None - Sara would make decisions for him if needed.         PREVENTIVE HEALTH MAINTENANCE             COLORECTAL CANCER SCREENING: Up to date (colonoscopy q10y; sigmoidoscopy q5y; Cologuard q3y) was last done 10/2016, Results are in chart; colonoscopy with the following abnormalities noted-- Diverticulosis         Surgical History:         Tonsillectomy     R Shoulder/R Knee;         Family History:     Father:  at age early 80s     Mother:  at age late 80s;  Alzheimer's Disease         Social History:     Occupation: Retired (Prior occupation: self employed - )     Marital Status:      Children: 1 child         Tobacco/Alcohol/Supplements:     Last Reviewed on 2020 02:43 PM by Mohinder Hutchison    Tobacco: He has never smoked.           Alcohol:  Does not drink alcohol and never has.          Substance Abuse History:     Last Reviewed on 11/24/2020 02:43 PM by Mohinder Hutchison        Mental Health History:     Last Reviewed on 11/24/2020 02:43 PM by Mohinder Hutchison        Communicable Diseases (eg STDs):     Last Reviewed on 11/24/2020 02:43 PM by Mohinder Hutchison        Current Problems:     Last Reviewed on 11/24/2020 02:43 PM by Mohinder Hutchison    Alzheimer's disease with late onset    Vitamin B12 deficiency anemia due to intrinsic factor deficiency    Abnormal results of kidney function studies    Benign prostatic hyperplasia without lower urinary tract symptoms        Immunizations:     None        Allergies:     Last Reviewed on 11/24/2020 02:43 PM by Mohinder Hutchison    No Known Allergies.        Current Medications:     Last Reviewed on 11/24/2020 02:43 PM by Mohinder Hutchison    Polysaccharide Iron Complex 150 mg iron oral capsule [Take 1 capsule(s) by mouth daily]    donepeziL 10 mg oral tablet [Take 1 tablet(s) by mouth daily]    cyanocobalamin (vitamin B-12) 1,000 mcg/mL injection Solution [inject 1 milliliter (1,000 mcg) by intramuscular route once a month]        Objective:        Vitals:         Current: 11/24/2020 2:27:51 PM    Ht:  5 ft, 8 in;  Wt: 143 lbs;  BMI: 21.7T: 97.7 F (temporal);  BP: 137/77 mm Hg (left arm, sitting);  P: 73 bpm (left arm (BP Cuff), sitting);  sCr: 1.34 mg/dL;  GFR: 38.23        Exams:     PHYSICAL EXAM:     GENERAL: Vitals recorded well developed, well nourished;     EYES: extraocular movements intact; conjunctiva and cornea are normal; PERRL;     NECK: range of motion is normal; thyroid is non-palpable;     RESPIRATORY: normal respiratory rate and pattern with no distress; normal breath sounds with no rales, rhonchi, wheezes or rubs;     CARDIOVASCULAR: normal rate; rhythm is regular;  no systolic murmur;     GASTROINTESTINAL: nontender; normal bowel sounds; no  masses;     LYMPHATIC: no enlargement of cervical or facial nodes; no supraclavicular nodes;     SKIN:  no significant rashes or lesions; no suspicious moles;     NEUROLOGIC: mental status: alert;  cranial nerves II-XII grossly intact;     PSYCHIATRIC: appropriate affect and demeanor; normal psychomotor function;         Assessment:         G30.1   Alzheimer's disease with late onset       D51.0   Vitamin B12 deficiency anemia due to intrinsic factor deficiency       R94.4   Abnormal results of kidney function studies           ORDERS:         Meds Prescribed:       [Recorded] Namenda Titration Tima 5-10 mg oral Tablet, Dose Pack [take by oral route per package directions]       [Refilled] Namenda Titration Tima 5-10 mg oral Tablet, Dose Pack [take by oral route per package directions], #49 (forty nine) tablets, Refills: 0 (zero)       [New Rx] Namenda 10 mg oral tablet [take 1 tablet (10 mg) by oral route 2 times per day], #180 (one hundred and eighty) tablets, Refills: 1 (one), Effective date: 12/24/2020       [Refilled] donepeziL 10 mg oral tablet [Take 1 tablet(s) by mouth daily], #90 (ninety) tablets, Refills: 1 (one)         Lab Orders:       21185  BDCB2 - [facility] CBC w/o diff  (Send-Out)            10484  B12FO - [facility] Vitamin B12 with Folate  (Send-Out)            40703  COMP - [facility] Comp. Metabolic Panel  (Send-Out)                      Plan:         Alzheimer's disease with late onset        RECOMMENDATIONS given include: Today, we have reviewed Francisco's care.  I'm going to advise adding Namenda to maximize our care for his dementia.  Otherwise, no specific changes are anticipated.  We will update labs as well.  Follow closely..            Prescriptions:       [Recorded] Namenda Titration Tima 5-10 mg oral Tablet, Dose Pack [take by oral route per package directions]       [Refilled] Namenda Titration Tima 5-10 mg oral Tablet, Dose Pack [take by oral route per package directions], #49 (forty nine) tablets, Refills: 0  (zero)       [New Rx] Namenda 10 mg oral tablet [take 1 tablet (10 mg) by oral route 2 times per day], #180 (one hundred and eighty) tablets, Refills: 1 (one), Effective date: 12/24/2020       [Refilled] donepeziL 10 mg oral tablet [Take 1 tablet(s) by mouth daily], #90 (ninety) tablets, Refills: 1 (one)         Vitamin B12 deficiency anemia due to intrinsic factor deficiency    LABORATORY:  Labs ordered to be performed today include B12 with Folate and CBC W/O DIFF.            Orders:       68052  BDCB2 - [facility] CBC w/o diff  (Send-Out)            43628  B12FO - [facility] Vitamin B12 with Folate  (Send-Out)              Abnormal results of kidney function studies    LABORATORY:  Labs ordered to be performed today include Comprehensive metabolic panel.            Orders:       29136  COMP - [facility] Comp. Metabolic Panel  (Send-Out)                  Charge Capture:         Primary Diagnosis:     G30.1  Alzheimer's disease with late onset           Orders:      08126  Office/outpatient visit; established patient, level 3  (In-House)              D51.0  Vitamin B12 deficiency anemia due to intrinsic factor deficiency     R94.4  Abnormal results of kidney function studies

## 2021-05-18 NOTE — PROGRESS NOTES
Teofilo Gomez 1940     Office/Outpatient Visit    Visit Date:  10:08 am    Provider: Mohinder Hutchison MD (Assistant: Amanda Gregory MA)    Location: [facility]        Electronically signed by Mohinder Hutchison MD on  2019 04:36:41 PM                             SUBJECTIVE:        CC: abdominal pain         HPI:         Patient complains of right lower quadrant abdominal pain.  This is located primarily in the right lower quadrant.  It does not radiate.  It began yesterday.  The onset of pain occurred with no apparent trigger.  He characterizes it as aching and dull.  It is of moderate intensity.  This is the first episode of this type of pain.  The typical duration is the majority of the day.  There are no obvious aggravating factors.  Nothing relieves the pain.  Associated symptoms include fever and nausea.  He denies constipation or vomiting.      ROS:     CONSTITUTIONAL:  Negative for chills and fever.      CARDIOVASCULAR:  Negative for chest pain and palpitations.      RESPIRATORY:  Negative for recent cough and dyspnea.      GENITOURINARY:  Negative for dysuria and hematuria.          [facility]/:     Last Reviewed on 2019 10:31 AM by Mohinder Hutchison    Past Medical History:             PAST MEDICAL HISTORY         Alzheimer's Disease         PREVENTIVE HEALTH MAINTENANCE             COLORECTAL CANCER SCREENING: Up to date (colonoscopy q10y; sigmoidoscopy q5y; Cologuard q3y) was last done 10/2016, Results are in chart; colonoscopy with the following abnormalities noted-- Diverticulosis         Surgical History:         Tonsillectomy      R Shoulder/R Knee;         Family History:     Father:  at age early 80s     Mother:  at age late 80s;  Alzheimer's Disease         Social History:     Occupation: Retired (Prior occupation: self employed - )     Marital Status:      Children: 1 child         Tobacco/Alcohol/Supplements:      Last Reviewed on 7/12/2019 10:31 AM by Mohinder Hutchison    Tobacco: He has never smoked.          Alcohol:  Does not drink alcohol and never has.          Substance Abuse History:     Last Reviewed on 7/12/2019 10:31 AM by Mohinder Hutchison        Mental Health History:     Last Reviewed on 7/12/2019 10:31 AM by Mohinder Hutchison        Communicable Diseases (eg STDs):     Last Reviewed on 7/12/2019 10:31 AM by Mohinder Hutchison            Current Problems:     Last Reviewed on 7/12/2019 10:31 AM by Mohinder Hutchison    Pernicious anemia     Memory loss     Headache     Nonspecific abnormality on kidney function test         Immunizations:     None        Allergies:     Last Reviewed on 7/12/2019 10:31 AM by Mohinder Hutchison      No Known Drug Allergies.         Current Medications:     Last Reviewed on 7/12/2019 10:31 AM by Mohinder Hutchison    Polysaccharide Iron Complex 150mg Capsules Take 1 capsule(s) by mouth daily     Vitamin B12 1,000mcg/1ml Injection 1 cc weekly x 4 weeks then monthly     Donepezil HCl 10mg Tablet Take 1 tablet(s) by mouth daily     Ibuprofen 600mg Tablet Take 1 tablet(s) by mouth q 4 to 6 hr prn     Zofran 4mg Tablet 1 po q 6 hours prn         OBJECTIVE:        Vitals:         Current: 7/12/2019 10:10:52 AM    Ht:  5 ft, 8 in;  Wt: 146.4 lbs;  BMI: 22.3    T: 98 F (oral);  BP: 135/75 mm Hg (left arm, sitting);  P: 66 bpm (left arm (BP Cuff), sitting);  sCr: 1.45 mg/dL;  GFR: 36.25        Exams:     PHYSICAL EXAM:     GENERAL: Vitals recorded well developed, well nourished;     EYES: extraocular movements intact; conjunctiva and cornea are normal; PERRL;     E/N/T: EARS:  normal external auditory canals and tympanic membranes;  grossly normal hearing; OROPHARYNX:  normal mucosa, dentition, gingiva, and posterior pharynx;     NECK: range of motion is normal; thyroid is non-palpable;     RESPIRATORY: normal respiratory rate and pattern with no distress;  normal breath sounds with no rales, rhonchi, wheezes or rubs;     CARDIOVASCULAR: normal rate; rhythm is regular;  no systolic murmur;     GASTROINTESTINAL: moderate RLQ pain;  voluntary guarding;  no rebound tenderness;  normal bowel sounds; no masses;     LYMPHATIC: no enlargement of cervical or facial nodes; no supraclavicular nodes;     SKIN:  no significant rashes or lesions; no suspicious moles;         ASSESSMENT           789.03   R10.31  Right lower quadrant abdominal pain              DDx:     600.00   N40.0  Hypertrophy (benign) of prostate without urinary obstruction and other lower urinary tract (LUTS)              DDx:         ORDERS:         Lab Orders:       90284  AMYS - [facility] Amylase, Serum  (Send-Out)         88438  BDCB2 - [facility] CBC w/o diff  (Send-Out)         45952  COMP - [facility] Comp. Metabolic Panel  (Send-Out)         16705  BDUA - [facility] Urinalysis, automated, with micro  (Send-Out)         *  [employer] Medicare screening PSA  (Send-Out)           Other Orders:       98792  CT Abdomen and Pelvis with IV Contrast; prefer oral prep  (Send-Out; Stat)                   PLAN:          Right lower quadrant abdominal pain     Francisco is tender over the R lower quadrant and has symptoms that could be consistent with appendicitis.  We will evaluate him for this as noted below.  I do not see a way to avoid CT scan.  We will see him back after to discuss results.     LABORATORY:  Labs ordered to be performed today include amylase, CBC W/O DIFF, Comprehensive metabolic panel, and urinalysis with micro.      RADIOLOGY:  I have ordered Test to be ordered CT of CT abdomen and pelvis with contrast; oral prep to be done today.          ADDENDUM - Francisco's CT does not suggest appendicitis.  We are waiting for some testing to come back.  For now, I have recommended he take in ample fluids and see how things play out.  We will also add a PSA to his labs given the enlargement of the prostate.  We will see how he feels  tomorrow as well. - Mohinder Hutchison MD - 7/12/19 - 15:23           Orders:       03950  AMYS - [facility] Amylase, Serum  (Send-Out)         90278  CB2 - [facility] CBC w/o diff  (Send-Out)         73648  COMP - [facility] Comp. Metabolic Panel  (Send-Out)         72916  BDUAM - [facility] Urinalysis, automated, with micro  (Send-Out)         37784  CT Abdomen and Pelvis with IV Contrast; prefer oral prep  (Send-Out; Stat)             Patient Education Handouts:       Abdominal Pain           Hypertrophy (benign) of prostate without urinary obstruction and other lower urinary tract (LUTS)     LABORATORY:  Labs ordered to be performed today include PSA.            Orders:       *  [employer] Medicare screening PSA  (Send-Out)               CHARGE CAPTURE           **Please note: ICD descriptions below are intended for billing purposes only and may not represent clinical diagnoses**        Primary Diagnosis:         789.03 Right lower quadrant abdominal pain            R10.31    Right lower quadrant pain              Orders:          92588   Office/outpatient visit; established patient, level 4  (In-House)           600.00 Hypertrophy (benign) of prostate without urinary obstruction and other lower urinary tract (LUTS)            N40.0    Benign prostatic hyperplasia without lower urinary tract symptoms

## 2021-05-18 NOTE — PROGRESS NOTES
Teofilo Gomez 1940     Office/Outpatient Visit    Visit Date: Tue, Dec 18, 2018 08:42 am    Provider: Mohinder Hutchison MD (Assistant: Zelda Serrano RN)    Location: [facility]        Electronically signed by Mohinder Hutchison MD on  2018 05:11:52 PM                             SUBJECTIVE:        CC: anemia follow up         HPI:     Francisco is in today for follow up on anemia.  He was noted to have a very mild anemia for which he was started on B12 injections.  He is doing those at home and tolerating them well.  He has not seen improvement in the memory.  He says that his energy level is pretty good, but his wife does continue to note fatigue.     ROS:     CONSTITUTIONAL:  Negative for chills and fever.      CARDIOVASCULAR:  Negative for chest pain and palpitations.      RESPIRATORY:  Negative for recent cough and dyspnea.      GASTROINTESTINAL:  Negative for abdominal pain, nausea and vomiting.          [facility]/:     Last Reviewed on 2018 09:26 AM by Mohinder Hutchison    Past Medical History:             PAST MEDICAL HISTORY         Alzheimer's Disease         PREVENTIVE HEALTH MAINTENANCE             COLORECTAL CANCER SCREENING: Up to date (colonoscopy q10y; sigmoidoscopy q5y; Cologuard q3y) was last done 10/2016, Results are in chart; colonoscopy with the following abnormalities noted-- Diverticulosis         Surgical History:         Tonsillectomy      R Shoulder/R Knee;         Family History:     Father:  at age early 80s     Mother:  at age late 80s;  Alzheimer's Disease         Social History:     Occupation: Retired (Prior occupation: self employed - )     Marital Status:      Children: 1 child         Tobacco/Alcohol/Supplements:     Last Reviewed on 2018 09:26 AM by Mohinder Hutchison    Tobacco: He has never smoked.          Alcohol:  Does not drink alcohol and never has.          Substance Abuse History:     Last Reviewed on  12/18/2018 09:26 AM by Mohinder Hutchison        Mental Health History:     Last Reviewed on 12/18/2018 09:26 AM by Mohinder Hutchison        Communicable Diseases (eg STDs):     Last Reviewed on 12/18/2018 09:26 AM by Mohinder Hutchison            Current Problems:     Last Reviewed on 12/18/2018 09:26 AM by Mohinder Hutchison    Pernicious anemia     Memory loss     Paresthesia         Immunizations:     None        Allergies:     Last Reviewed on 12/18/2018 09:26 AM by Mohinder Hutchison      No Known Drug Allergies.         Current Medications:     Last Reviewed on 12/18/2018 09:26 AM by Mohinder Hutchison    Vitamin B12 1,000mcg/1ml Injection 1 cc weekly x 4 weeks then monthly     Polysaccharide Iron Complex 150mg Capsules Take 1 capsule(s) by mouth daily         OBJECTIVE:        Vitals:         Current: 12/18/2018 8:45:20 AM    Ht:  5 ft, 8 in;  Wt: 146.4 lbs;  BMI: 22.3    T: 97.7 F (oral);  BP: 121/69 mm Hg (left arm, sitting);  P: 73 bpm (left arm (BP Cuff), sitting);  sCr: 1.3 mg/dL;  GFR: 40.43        Exams:     PHYSICAL EXAM:     GENERAL: Vitals recorded well developed, well nourished;     NECK: range of motion is normal; thyroid is non-palpable;     RESPIRATORY: normal respiratory rate and pattern with no distress; normal breath sounds with no rales, rhonchi, wheezes or rubs;     CARDIOVASCULAR: normal rate; rhythm is regular;  no systolic murmur;     GASTROINTESTINAL: nontender; normal bowel sounds; no masses;     SKIN:  no significant rashes or lesions; no suspicious moles;     PSYCHIATRIC: appropriate affect and demeanor;         ASSESSMENT           281.0   D51.0  Pernicious anemia              DDx:     794.4   R94.4  Nonspecific abnormality on kidney function test              DDx:         ORDERS:         Radiology/Test Orders:       3017F  Colorectal CA screen results documented and reviewed (PV)  (In-House)           Lab Orders:       11349  VB12 - [facility] Vitamin B12   (Send-Out)         16629  BDCB2 - [facility] CBC w/o diff  (Send-Out)         83178  IRON - [facility] Iron, serum  (Send-Out)         63277  BMP - [facility] Basic Metabolic Panel  (Send-Out)                   PLAN:          Pernicious anemia     LABORATORY:  Labs ordered to be performed today include B12, CBC W/O DIFF, and Iron Serum.      RECOMMENDATIONS given include: Francisco seems well today.  I'm going to recheck the anemia as noted below and go from there.  No other changes for now.  He will see me as needed moving forward..  MIPS     COLORECTAL CANCER SCREENING: Results are in chart           Orders:       3017F  Colorectal CA screen results documented and reviewed (PV)  (In-House)         67801  VB12 - [facility] Vitamin B12  (Send-Out)         17307  BDCB2 - [facility] CBC w/o diff  (Send-Out)         17349  IRON - [facility] Iron, serum  (Send-Out)             Patient Education Handouts:       Anemia           Nonspecific abnormality on kidney function test     LABORATORY:  Labs ordered to be performed today include basic metabolic panel.            Orders:       40225  BMP - [facility] Basic Metabolic Panel  (Send-Out)               CHARGE CAPTURE           **Please note: ICD descriptions below are intended for billing purposes only and may not represent clinical diagnoses**        Primary Diagnosis:         281.0 Pernicious anemia            D51.0    Vitamin B12 deficiency anemia due to intrinsic factor deficiency              Orders:          95224   Office/outpatient visit; established patient, level 3  (In-House)             3017F   Colorectal CA screen results documented and reviewed (PV)  (In-House)           794.4 Nonspecific abnormality on kidney function test            R94.4    Abnormal results of kidney function studies        ADDENDUMS:      ____________________________________    Addendum: 12/26/2018 03:30 PM - Melissa Rios         Visit Note Faxed to:        Félix Delgado  (Surgery, Urological); Number [phone]

## 2021-05-28 ENCOUNTER — HOSPITAL ENCOUNTER (OUTPATIENT)
Dept: OTHER | Facility: HOSPITAL | Age: 81
Discharge: HOME | End: 2021-05-28
Attending: FAMILY MEDICINE

## 2021-05-28 ENCOUNTER — OFFICE VISIT CONVERTED (OUTPATIENT)
Dept: FAMILY MEDICINE CLINIC | Age: 81
End: 2021-05-28
Attending: FAMILY MEDICINE

## 2021-05-28 LAB
ALBUMIN SERPL-MCNC: 4.2 G/DL (ref 3.5–5)
ALBUMIN/GLOB SERPL: 1.4 {RATIO} (ref 1.4–2.6)
ALP SERPL-CCNC: 100 U/L (ref 56–155)
ALT SERPL-CCNC: 10 U/L (ref 10–40)
ANION GAP SERPL CALC-SCNC: 15 MMOL/L (ref 8–19)
AST SERPL-CCNC: 17 U/L (ref 15–50)
BILIRUB SERPL-MCNC: 0.6 MG/DL (ref 0.2–1.3)
BUN SERPL-MCNC: 13 MG/DL (ref 5–25)
BUN/CREAT SERPL: 8 {RATIO} (ref 6–20)
CALCIUM SERPL-MCNC: 9.1 MG/DL (ref 8.7–10.4)
CHLORIDE SERPL-SCNC: 106 MMOL/L (ref 99–111)
CONV CO2: 25 MMOL/L (ref 22–32)
CONV TOTAL PROTEIN: 7.1 G/DL (ref 6.3–8.2)
CREAT UR-MCNC: 1.68 MG/DL (ref 0.7–1.2)
ERYTHROCYTE [DISTWIDTH] IN BLOOD BY AUTOMATED COUNT: 12.1 % (ref 11.5–14.5)
ERYTHROCYTE [SEDIMENTATION RATE] IN BLOOD: 2 MM/H (ref 0–20)
FOLATE SERPL-MCNC: 12.6 NG/ML (ref 4.8–20)
GFR SERPLBLD BASED ON 1.73 SQ M-ARVRAT: 37 ML/MIN/{1.73_M2}
GLOBULIN UR ELPH-MCNC: 2.9 G/DL (ref 2–3.5)
GLUCOSE SERPL-MCNC: 87 MG/DL (ref 70–99)
HBA1C MFR BLD: 12.6 G/DL (ref 14–18)
HCT VFR BLD AUTO: 39 % (ref 42–52)
MCH RBC QN AUTO: 30 PG (ref 27–31)
MCHC RBC AUTO-ENTMCNC: 32.3 G/DL (ref 33–37)
MCV RBC AUTO: 92.9 FL (ref 80–96)
OSMOLALITY SERPL CALC.SUM OF ELEC: 293 MOSM/KG (ref 273–304)
PLATELET # BLD AUTO: 180 10*3/UL (ref 130–400)
PMV BLD AUTO: 10.4 FL (ref 7.4–10.4)
POTASSIUM SERPL-SCNC: 4.1 MMOL/L (ref 3.5–5.3)
PSA SERPL-MCNC: 2.28 NG/ML (ref 0–4)
RBC # BLD AUTO: 4.2 10*6/UL (ref 4.7–6.1)
SODIUM SERPL-SCNC: 142 MMOL/L (ref 135–147)
VIT B12 SERPL-MCNC: 360 PG/ML (ref 211–911)
WBC # BLD AUTO: 5.12 10*3/UL (ref 4.8–10.8)

## 2021-06-05 NOTE — PROGRESS NOTES
Teofilo Gomez  1940     Office/Outpatient Visit    Visit Date: Fri, May 28, 2021 08:52 am    Provider: Mohinder Hutchison MD (Assistant: Zelda Serrano RN)    Location: [facility]        Electronically signed by Mohinder Hutchison MD on  05/29/2021 01:13:22 PM                             Subjective:        CC: wellness exam, follow up on kidney and prostate issues    HPI:           Francisco is here for a Medicare wellness visit.  The required HRA questions are integrated within this visit note. Family medical history and individual medical/surgical history were reviewed and updated.  A current height, weight, BMI, blood pressure, and pulse were recorded in the vitals section of the note and have been reviewed. Patient's medications, including supplements, were recorded in the chart and reviewed.  Current providers and suppliers were reviewed and updated.          Self-Assessment of Health: He rates his health as very good. He rates his confidence of being able to control/manage most of his health problems as I do not have any health problems. His physical/emotional health has limited his social activites not at all.  A review of possible cognitive impairment was performed and the following was noted: memory changes are noted A review of functional ability, including bathing, dressing, walking, and urine/bowel continence as well as level of safety was performed and was found to be negative.  Falls Risk: Has fallen 2 or more times or had one fall with injury in the past year.  He denies having trouble hearing the TV/radio when others do not, having to strain to hear or understand conversations and wearing hearing aid(s).  Concerning home safety, he reports that at home he DOES have adequate lighting, functioning smoke alarms and absence of throw rugs, but not a skid resistant shower/tub, grab bars in the bath or handrails on stairs.          Immunization Status: declines vaccinations;  Physical Activity: He never excercises.; Type of diet patient normally eats is described as well-balanced with fruits and vegetables Tobacco: He has never smoked.  Preventative Health updated today.        Francisco is in also in today for follow up on Alzheimer's disease.  He has been diagnosed with this for some time.  He does remain on Namenda and Aricept.  His wife states that his memory does not seem better.  He also has seemed more agitated with her.  This is starting to create some issue for them.          Francisco also has B12 deficiency for which he he has been prescribed vitamin B12.  He is not currently taking that injection though.          Francisco also has history of chronic renal failure.  We have been monitoring this over time.  No referral as of yet though.    ROS:     CONSTITUTIONAL:  Negative for chills and fever.      CARDIOVASCULAR:  Positive for chest pain ( unrelated to exertion ).   Negative for palpitations.      RESPIRATORY:  Negative for recent cough and dyspnea.      GASTROINTESTINAL:  Negative for abdominal pain, nausea and vomiting.      INTEGUMENTARY:  Negative for atypical mole(s) and rash.          Past Medical History / Family History / Social History:         Last Reviewed on 2021 09:19 AM by Mohinder Hutchison    Past Medical History:             PAST MEDICAL HISTORY         Alzheimer's Disease             ADVANCED DIRECTIVES: None - Sara would make decisions for him if needed.         PREVENTIVE HEALTH MAINTENANCE             COLORECTAL CANCER SCREENING: Up to date (colonoscopy q10y; sigmoidoscopy q5y; Cologuard q3y) was last done 10/2016, Results are in chart; colonoscopy with the following abnormalities noted-- Diverticulosis     PSA: was last done 19 with normal results         PAST MEDICAL HISTORY             CURRENT MEDICAL PROVIDERS: none         Surgical History:         Tonsillectomy     R Shoulder/R Knee;         Family History:     Father:  at age early 80s      Mother:  at age late 80s;  Alzheimer's Disease         Social History:     Occupation: Retired (Prior occupation: self employed - )     Marital Status:      Children: 1 child         Tobacco/Alcohol/Supplements:     Last Reviewed on 2021 09:19 AM by Mohinder Hutchison    Tobacco: He has never smoked.          Alcohol:  Does not drink alcohol and never has.          Substance Abuse History:     Last Reviewed on 2021 09:19 AM by Mohinder Hutchison        Mental Health History:     Last Reviewed on 2021 09:19 AM by Mohinder Hutchison        Communicable Diseases (eg STDs):     Last Reviewed on 2021 09:19 AM by Mohinder Hutchison        Current Problems:     Last Reviewed on 2021 09:19 AM by Mohinder Hutchison    Alzheimer's disease with late onset    Vitamin B12 deficiency anemia due to intrinsic factor deficiency    Abnormal results of kidney function studies    Benign prostatic hyperplasia without lower urinary tract symptoms    Encounter for general adult medical examination without abnormal findings        Immunizations:     None        Allergies:     Last Reviewed on 2021 09:19 AM by Mohinder Hutchison    No Known Allergies.        Current Medications:     Last Reviewed on 2021 09:19 AM by Mohinder Hutchison    Polysaccharide Iron Complex 150 mg iron oral capsule [Take 1 capsule(s) by mouth daily]    donepeziL 10 mg oral tablet [Take 1 tablet(s) by mouth daily]    cyanocobalamin (vitamin B-12) 1,000 mcg/mL injection Solution [inject 1 milliliter (1,000 mcg) by intramuscular route once a month]    Namenda Titration Tima 5-10 mg oral Tablet, Dose Pack [take by oral route per package directions]    Namenda 10 mg oral tablet [take 1 tablet (10 mg) by oral route 2 times per day]        Objective:        Vitals:         Current: 2021 9:02:02 AM    Ht:  5 ft, 8 in;  Wt: 148.4 lbs;  BMI: 22.6T: 97.4 F (temporal);  BP: 157/83  mm Hg (right arm, sitting);  P: 70 bpm (right arm (BP Cuff), sitting);  sCr: 1.52 mg/dL;  GFR: 33.69        Repeat:     9:43:9 AM  BP:   160/75mm Hg (right arm, sitting, p-63)     Exams:     PHYSICAL EXAM:     GENERAL: Vitals recorded well developed, well nourished;     EYES: extraocular movements intact; conjunctiva and cornea are normal; PERRL; binocular vision is 20/30 - hearing is slightly diminished on the L side    NECK: range of motion is normal; thyroid is non-palpable;     RESPIRATORY: normal respiratory rate and pattern with no distress; normal breath sounds with no rales, rhonchi, wheezes or rubs;     CARDIOVASCULAR: normal rate; rhythm is regular;  no systolic murmur;     GASTROINTESTINAL: nontender; normal bowel sounds; no masses;     LYMPHATIC: no enlargement of cervical or facial nodes; no supraclavicular nodes;     SKIN:  no significant rashes or lesions; no suspicious moles;     NEUROLOGIC: mental status: alert;  cranial nerves II-XII grossly intact;     PSYCHIATRIC: appropriate affect and demeanor; normal psychomotor function;         Lab/Test Results:         LABORATORY RESULTS: EKG performed by tls         Assessment:         Z00.00   Encounter for general adult medical examination without abnormal findings       G30.1   Alzheimer's disease with late onset       D51.0   Vitamin B12 deficiency anemia due to intrinsic factor deficiency       N18.32     Chronic kidney disease, stage 3b       R07.9   Chest pain, unspecified       R51.9   Headache, unspecified       N40.0   Benign prostatic hyperplasia without lower urinary tract symptoms           ORDERS:         Meds Prescribed:       [Refilled] Namenda 10 mg oral tablet [take 1 tablet (10 mg) by oral route 2 times per day], #180 (one hundred and eighty) tablets, Refills: 1 (one)       [Refilled] donepeziL 10 mg oral tablet [take 1 tablet (10 mg) by oral route once daily in the evening], #90 (ninety) tablets, Refills: 1 (one)       [New Rx]  citalopram 10 mg oral tablet [take 1 tablet (10 mg) by oral route once daily], #30 (thirty) tablets, Refills: 1 (one)         Radiology/Test Orders:       3017F  Colorectal CA screen results documented and reviewed (PV)  (In-House)            20151  Electrocardiogram, routine with at least 12 leads; with interpretation and report  (In-House)              Lab Orders:       [id number]  B12FO - [facility] Vitamin B12 with Folate  (Send-Out)            65143  BDCB2 - [facility] CBC w/o diff  (Send-Out)            37019  COMP - [facility] Comp. Metabolic Panel  (Send-Out)            23349  SED - [facility] Sedimentation rate, non-automated ESR  (Send-Out)            *  [employer] Medicare screening PSA  (Send-Out)              Other Orders:       1100F  Pt screen for fall risk; document 2+ falls in the past yr or any fall w/injury in past year (MOHSEN)  (In-House)            1124F  Advance Care Planning discussed and doc in MR; no surrogate named or advance care plan provided  (Send-Out)              Depression screen positive and follow up plan documented  (In-House)              Subsequent Annual Well Visit Medicare  (x1)                  Plan:         Encounter for general adult medical examination without abnormal findings        RECOMMENDATIONS given include: Please see attached wellness recommendations.  We will continue current care for his problems as noted.  Labs and EKG today for problems noted.  I have advised that he not drive unless he has a formal driving evaluation given potential risks..  MIPS PHQ-9 Depression Screening: Completed form scanned and in chart; Total Score 12 Positive Depression Screen: Pharmacologic intervention initiated/modified ADVANCED DIRECTIVES: None               Prescriptions:       [Refilled] Namenda 10 mg oral tablet [take 1 tablet (10 mg) by oral route 2 times per day], #180 (one hundred and eighty) tablets, Refills: 1 (one)       [Refilled] donepeziL 10 mg oral tablet [take 1 tablet (10 mg) by oral route  once daily in the evening], #90 (ninety) tablets, Refills: 1 (one)       [New Rx] citalopram 10 mg oral tablet [take 1 tablet (10 mg) by oral route once daily], #30 (thirty) tablets, Refills: 1 (one)           Orders:       1100F  Pt screen for fall risk; document 2+ falls in the past yr or any fall w/injury in past year (MOHSEN)  (In-House)            3017F  Colorectal CA screen results documented and reviewed (PV)  (In-House)            1124F  Advance Care Planning discussed and doc in MR; no surrogate named or advance care plan provided  (Send-Out)              Depression screen positive and follow up plan documented  (In-House)              Alzheimer's disease with late onsetAs above.        Vitamin B12 deficiency anemia due to intrinsic factor deficiency    LABORATORY:  Labs ordered to be performed today include B12 with Folate and CBC W/O DIFF.            Orders:       [id number]  B12FO - [facility] Vitamin B12 with Folate  (Send-Out)            68827  BDCB2 - [facility] CBC w/o diff  (Send-Out)              Chronic kidney disease, stage 3b    LABORATORY:  Labs ordered to be performed today include Comprehensive metabolic panel.            Orders:       66776  COMP - [facility] Comp. Metabolic Panel  (Send-Out)              Chest pain, unspecified        TESTS/PROCEDURES:  Will proceed with an ECG to be performed/scheduled now.            Orders:       11938  Electrocardiogram, routine with at least 12 leads; with interpretation and report  (In-House)              Headache, unspecified    LABORATORY:  Labs ordered to be performed today include ESR.            Orders:       [id number]  SED - [facility] Sedimentation rate, non-automated ESR  (Send-Out)              Benign prostatic hyperplasia without lower urinary tract symptoms    LABORATORY:  Labs ordered to be performed today include PSA Screening Medicare patients.            Orders:       *  PRSAS Medicare screening PSA  (Send-Out)                  Charge Capture:         Primary  Diagnosis:     Z00.00  Encounter for general adult medical examination without abnormal findings           Orders:      07636  Preventive medicine, established patient, age 65+ years  (In-House)              Subsequent Annual Well Visit Medicare  (x1)        1100F  Pt screen for fall risk; document 2+ falls in the past yr or any fall w/injury in past year (MOHSEN)  (In-House)            3017F  Colorectal CA screen results documented and reviewed (PV)  (In-House)              Depression screen positive and follow up plan documented  (In-House)              G30.1  Alzheimer's disease with late onset           Orders:      74421-47  Office/outpatient visit; established patient, level 4  (In-House)              D51.0  Vitamin B12 deficiency anemia due to intrinsic factor deficiency     N18.32    Chronic kidney disease, stage 3b     R07.9  Chest pain, unspecified           Orders:      90367  Electrocardiogram, routine with at least 12 leads; with interpretation and report  (In-House)              R51.9  Headache, unspecified     N40.0  Benign prostatic hyperplasia without lower urinary tract symptoms

## 2021-07-01 VITALS
HEIGHT: 68 IN | BODY MASS INDEX: 22.16 KG/M2 | WEIGHT: 146.2 LBS | HEART RATE: 68 BPM | SYSTOLIC BLOOD PRESSURE: 126 MMHG | DIASTOLIC BLOOD PRESSURE: 73 MMHG | TEMPERATURE: 98.1 F

## 2021-07-01 VITALS
DIASTOLIC BLOOD PRESSURE: 74 MMHG | WEIGHT: 143.6 LBS | SYSTOLIC BLOOD PRESSURE: 133 MMHG | TEMPERATURE: 97.6 F | HEART RATE: 64 BPM | HEIGHT: 68 IN | BODY MASS INDEX: 21.76 KG/M2

## 2021-07-01 VITALS
BODY MASS INDEX: 22.25 KG/M2 | HEART RATE: 64 BPM | HEIGHT: 68 IN | SYSTOLIC BLOOD PRESSURE: 140 MMHG | DIASTOLIC BLOOD PRESSURE: 75 MMHG | WEIGHT: 146.8 LBS | TEMPERATURE: 98.2 F

## 2021-07-01 VITALS
TEMPERATURE: 98 F | HEART RATE: 66 BPM | HEIGHT: 68 IN | DIASTOLIC BLOOD PRESSURE: 75 MMHG | SYSTOLIC BLOOD PRESSURE: 135 MMHG | WEIGHT: 146.4 LBS | BODY MASS INDEX: 22.19 KG/M2

## 2021-07-01 VITALS
WEIGHT: 146.8 LBS | SYSTOLIC BLOOD PRESSURE: 141 MMHG | BODY MASS INDEX: 22.25 KG/M2 | DIASTOLIC BLOOD PRESSURE: 78 MMHG | HEART RATE: 67 BPM | HEIGHT: 68 IN | TEMPERATURE: 97.5 F

## 2021-07-01 VITALS
WEIGHT: 146.4 LBS | HEART RATE: 73 BPM | SYSTOLIC BLOOD PRESSURE: 121 MMHG | TEMPERATURE: 97.7 F | BODY MASS INDEX: 22.19 KG/M2 | DIASTOLIC BLOOD PRESSURE: 69 MMHG | HEIGHT: 68 IN

## 2021-07-02 VITALS
BODY MASS INDEX: 22.16 KG/M2 | TEMPERATURE: 98.2 F | DIASTOLIC BLOOD PRESSURE: 64 MMHG | SYSTOLIC BLOOD PRESSURE: 124 MMHG | WEIGHT: 146.2 LBS | HEIGHT: 68 IN | HEART RATE: 65 BPM

## 2021-07-02 VITALS
HEIGHT: 68 IN | TEMPERATURE: 97.7 F | WEIGHT: 143 LBS | BODY MASS INDEX: 21.67 KG/M2 | DIASTOLIC BLOOD PRESSURE: 77 MMHG | SYSTOLIC BLOOD PRESSURE: 137 MMHG | HEART RATE: 73 BPM

## 2021-07-02 VITALS
TEMPERATURE: 97.4 F | HEIGHT: 68 IN | SYSTOLIC BLOOD PRESSURE: 160 MMHG | HEART RATE: 70 BPM | DIASTOLIC BLOOD PRESSURE: 75 MMHG | WEIGHT: 148.4 LBS | BODY MASS INDEX: 22.49 KG/M2

## 2021-07-27 RX ORDER — CITALOPRAM 10 MG/1
TABLET ORAL
Qty: 90 TABLET | Refills: 1 | Status: SHIPPED | OUTPATIENT
Start: 2021-07-27 | End: 2021-09-02 | Stop reason: SDUPTHER

## 2021-08-02 ENCOUNTER — TELEPHONE (OUTPATIENT)
Dept: FAMILY MEDICINE CLINIC | Age: 81
End: 2021-08-02

## 2021-08-02 NOTE — TELEPHONE ENCOUNTER
Pt has appointment with Cardiology tomorrow but they are needing the abnormal EKG scanned into Epic.

## 2021-08-03 ENCOUNTER — OFFICE VISIT (OUTPATIENT)
Dept: CARDIOLOGY | Facility: CLINIC | Age: 81
End: 2021-08-03

## 2021-08-03 VITALS
BODY MASS INDEX: 21.77 KG/M2 | SYSTOLIC BLOOD PRESSURE: 143 MMHG | DIASTOLIC BLOOD PRESSURE: 107 MMHG | HEART RATE: 67 BPM | HEIGHT: 69 IN | WEIGHT: 147 LBS

## 2021-08-03 DIAGNOSIS — R94.31 ABNORMAL EKG: ICD-10-CM

## 2021-08-03 DIAGNOSIS — I10 ESSENTIAL HYPERTENSION: ICD-10-CM

## 2021-08-03 DIAGNOSIS — R07.2 PRECORDIAL PAIN: Primary | ICD-10-CM

## 2021-08-03 PROCEDURE — 99204 OFFICE O/P NEW MOD 45 MIN: CPT | Performed by: INTERNAL MEDICINE

## 2021-08-03 RX ORDER — ASPIRIN 81 MG/1
81 TABLET ORAL DAILY
COMMUNITY
End: 2022-09-09

## 2021-08-03 RX ORDER — MEMANTINE HYDROCHLORIDE 10 MG/1
10 TABLET ORAL DAILY
COMMUNITY
End: 2021-09-02 | Stop reason: SDUPTHER

## 2021-08-03 RX ORDER — LISINOPRIL 5 MG/1
5 TABLET ORAL DAILY
Qty: 90 TABLET | Refills: 3 | Status: SHIPPED | OUTPATIENT
Start: 2021-08-03 | End: 2022-03-04 | Stop reason: SDUPTHER

## 2021-08-03 NOTE — PROGRESS NOTES
Chief Complaint  Chest Pain (has had chest pain, but none at present), Shortness of Breath, Hypertension, and Alzheimer's Disease    Subjective            Teofilo Gomez presents to [facility] CARDIOLOGY  History of Present Illness    80-year-old white male, he has no previous cardiac history.  Recently he had an episode of chest pain described as heaviness, at rest, lasting about 15 minutes.  It has not recurred or become a pattern of symptoms.  He is fairly sedentary.  He apparently has baseline dementia and does not provide very much history, most of the details are provided by his wife.  He has not had any previous cardiac work-up.  EKG May 28 showed sinus rhythm with a right bundle branch block with no acute changes.    PMH  Past Medical History:   Diagnosis Date   • Abnormal ECG    • Alzheimer disease (CMS/HCC)    • Colon polyp    • GERD (gastroesophageal reflux disease)    • Hypertension    • Lipoma 07/17/2014    LLQ   • Shoulder pain     Right   • Tendonitis of shoulder 07/10/2014         SURGICALHX  Past Surgical History:   Procedure Laterality Date   • COLON SURGERY     • KNEE SURGERY     • KNEE SURGERY     • SHOULDER SURGERY     • SHOULDER SURGERY     • TONSILLECTOMY          SOC  Social History     Socioeconomic History   • Marital status:      Spouse name: Not on file   • Number of children: Not on file   • Years of education: Not on file   • Highest education level: Not on file   Tobacco Use   • Smoking status: Never Smoker   • Smokeless tobacco: Never Used   Vaping Use   • Vaping Use: Never used   Substance and Sexual Activity   • Alcohol use: Never   • Drug use: Never         FAMHX  Family History   Problem Relation Age of Onset   • Alzheimer's disease Mother    • Heart disease Mother    • Cancer Father         Unspecified - mouth ca   • Colon cancer Father           ALLERGY  No Known Allergies     MEDSCURRENT    Current Outpatient Medications:   •  aspirin 81 MG EC tablet,  "Take 81 mg by mouth Daily., Disp: , Rfl:   •  citalopram (CeleXA) 10 MG tablet, Take 1 tablet by mouth once daily, Disp: 90 tablet, Rfl: 1  •  memantine (Namenda) 10 MG tablet, Take 10 mg by mouth Daily., Disp: , Rfl:   •  lisinopril (PRINIVIL,ZESTRIL) 5 MG tablet, Take 1 tablet by mouth Daily., Disp: 90 tablet, Rfl: 3      Review of Systems   Constitutional: Negative.   HENT: Negative.    Eyes: Negative.    Cardiovascular: Positive for chest pain.   Respiratory: Negative.    Endocrine: Negative.    Hematologic/Lymphatic: Negative.    Skin: Negative.    Musculoskeletal: Negative.    Gastrointestinal: Negative.    Genitourinary: Negative.    Neurological: Negative.    Psychiatric/Behavioral: Positive for memory loss.        Objective     BP (!) 143/107   Pulse 67   Ht 175.3 cm (69\")   Wt 66.7 kg (147 lb)   BMI 21.71 kg/m²       General Appearance:   · well developed  · well nourished  HENT:   · oropharynx moist  · lips not cyanotic  Neck:  · thyroid not enlarged  · supple  Respiratory:  · no respiratory distress  · normal breath sounds  · no rales  Cardiovascular:  · no jugular venous distention  · regular rhythm  · apical impulse normal  · S1 normal, S2 normal  · no S3, no S4   · no murmur  · no rub, no thrill  · carotid pulses normal; no bruit  · pedal pulses normal  · lower extremity edema: none    Musculoskeletal:  · no clubbing of fingers.   · normocephalic, head atraumatic  Skin:   · warm, dry  Psychiatric:  · judgement and insight appropriate  · normal mood and affect      Result Review :     The following data was reviewed by: Adonis Nieto MD on 08/03/2021:    CMP    CMP 11/24/20 3/3/21 5/28/21   Glucose 89 73 87   BUN 12 12 13   Creatinine 1.52 (A) 1.52 (A) 1.68 (A)   Sodium 135 141 142   Potassium 3.7 3.8 4.1   Chloride 99 106 106   Calcium 9.2 9.1 9.1   Albumin 3.9  4.2   Total Bilirubin 0.34  0.60   Alkaline Phosphatase 79  100   AST (SGOT) 15  17   ALT (SGPT) 8 (A)  10   (A) Abnormal " value       Comments are available for some flowsheets but are not being displayed.           CBC    CBC 11/24/20 3/3/21 5/28/21   WBC 5.75 5.28 5.12   RBC 4.07 (A) 3.98 (A) 4.20 (A)   Hemoglobin 12.20 (A) 12.10 (A) 12.60 (A)   Hematocrit 37.6 (A) 37.3 (A) 39.0 (A)   MCV 92.4 93.7 92.9   MCH 30.0 30.4 30.0   MCHC 32.4 (A) 32.4 (A) 32.3 (A)   RDW 12.1 12.5 12.1   Platelets 203.00 185.00 180.00   (A) Abnormal value                  Data reviewed: EKG reviewed by me personally from May 28, sinus rhythm, right bundle branch block, no previous tracing available     Procedures                Assessment and Plan        ASSESSMENT:  Encounter Diagnoses   Name Primary?   • Precordial pain Yes   • Abnormal EKG    • Essential hypertension          PLAN:    1.  The patient had an episode of relatively typical chest pain, but it has not become a recurring pattern.  Unfortunately he is fairly sedentary and does not do much physical activity.  A stress imaging study and echo will be scheduled to evaluate for underlying ischemic or structural heart disease.  2.  His blood pressure is uncontrolled and has been on recent medical encounters, I am starting lisinopril 5 mg daily, check basic metabolic panel in 2 weeks  3.  Continue aspirin prevention.    Follow-up in 1 month          Patient was given instructions and counseling regarding his condition or for health maintenance advice. Please see specific information pulled into the AVS if appropriate.             MELISSA Nieto MD  8/3/2021    10:24 EDT

## 2021-08-11 ENCOUNTER — TELEPHONE (OUTPATIENT)
Dept: FAMILY MEDICINE CLINIC | Age: 81
End: 2021-08-11

## 2021-08-12 DIAGNOSIS — R07.2 PRECORDIAL PAIN: ICD-10-CM

## 2021-08-12 DIAGNOSIS — I10 ESSENTIAL HYPERTENSION: ICD-10-CM

## 2021-08-12 NOTE — TELEPHONE ENCOUNTER
Noted.  I have reviewed his chart and e-MDs and would recommend follow-up at their convenience.  It is not urgent, but I do want to follow-up on some testing since it has been about 3 months since he was seen.  Thanks.

## 2021-08-16 ENCOUNTER — TELEPHONE (OUTPATIENT)
Dept: CARDIOLOGY | Facility: CLINIC | Age: 81
End: 2021-08-16

## 2021-08-16 NOTE — TELEPHONE ENCOUNTER
----- Message from MELISSA Nieto MD sent at 8/12/2021 12:51 PM EDT -----  Labs are stable, continue lisinopril  Follow up Sept 7th, can be on B[facility]u's Tuesday [place] schedule that day instead of mine.  Appointment for 9/28 that is on the schedule also is not necessary and can be removed

## 2021-08-19 ENCOUNTER — OUTSIDE FACILITY SERVICE (OUTPATIENT)
Dept: CARDIOLOGY | Facility: CLINIC | Age: 81
End: 2021-08-19

## 2021-08-19 PROCEDURE — 93306 TTE W/DOPPLER COMPLETE: CPT | Performed by: INTERNAL MEDICINE

## 2021-08-20 ENCOUNTER — OUTSIDE FACILITY SERVICE (OUTPATIENT)
Dept: CARDIOLOGY | Facility: CLINIC | Age: 81
End: 2021-08-20

## 2021-08-20 PROCEDURE — OUTSIDEPOS PR OUTSIDE POS PLACEHOLDER: Performed by: INTERNAL MEDICINE

## 2021-08-20 PROCEDURE — 93018 CV STRESS TEST I&R ONLY: CPT | Performed by: INTERNAL MEDICINE

## 2021-08-20 PROCEDURE — 78452 HT MUSCLE IMAGE SPECT MULT: CPT | Performed by: INTERNAL MEDICINE

## 2021-08-24 ENCOUNTER — TELEPHONE (OUTPATIENT)
Dept: CARDIOLOGY | Facility: CLINIC | Age: 81
End: 2021-08-24

## 2021-08-24 DIAGNOSIS — R07.2 PRECORDIAL PAIN: ICD-10-CM

## 2021-08-24 DIAGNOSIS — R94.31 ABNORMAL EKG: ICD-10-CM

## 2021-08-24 NOTE — TELEPHONE ENCOUNTER
Echocardiogram reviewed, ejection fraction 40 to 45%, mildly reduced, trivial mitral/tricuspid/aortic regurgitation, mild right-sided chamber enlargement    Follow-up in the office as scheduled September

## 2021-08-24 NOTE — TELEPHONE ENCOUNTER
Nuclear stress test negative for evidence of blocked artery, normal heart function.    Low likelihood of blocked arteries based on this    Await echocardiogram.

## 2021-08-25 ENCOUNTER — TELEPHONE (OUTPATIENT)
Dept: CARDIOLOGY | Facility: CLINIC | Age: 81
End: 2021-08-25

## 2021-08-25 NOTE — TELEPHONE ENCOUNTER
----- Message from Destinee Badillo RN sent at 8/24/2021  1:08 PM EDT -----  Echo        Echo reviewed, mildly reduced heart muscle function, valves okay    I adjusted medication recently and he has follow-up in the office, will discuss further at that point

## 2021-09-02 ENCOUNTER — OFFICE VISIT (OUTPATIENT)
Dept: FAMILY MEDICINE CLINIC | Age: 81
End: 2021-09-02

## 2021-09-02 VITALS
TEMPERATURE: 98.1 F | HEART RATE: 67 BPM | HEIGHT: 69 IN | DIASTOLIC BLOOD PRESSURE: 84 MMHG | WEIGHT: 144.8 LBS | BODY MASS INDEX: 21.45 KG/M2 | SYSTOLIC BLOOD PRESSURE: 141 MMHG

## 2021-09-02 DIAGNOSIS — N40.0 BPH WITHOUT OBSTRUCTION/LOWER URINARY TRACT SYMPTOMS: ICD-10-CM

## 2021-09-02 DIAGNOSIS — R13.12 OROPHARYNGEAL DYSPHAGIA: ICD-10-CM

## 2021-09-02 DIAGNOSIS — G30.1 LATE ONSET ALZHEIMER'S DEMENTIA WITHOUT BEHAVIORAL DISTURBANCE (HCC): Primary | ICD-10-CM

## 2021-09-02 DIAGNOSIS — F02.80 LATE ONSET ALZHEIMER'S DEMENTIA WITHOUT BEHAVIORAL DISTURBANCE (HCC): Primary | ICD-10-CM

## 2021-09-02 DIAGNOSIS — D51.0 VITAMIN B12 DEFICIENCY ANEMIA DUE TO INTRINSIC FACTOR DEFICIENCY: ICD-10-CM

## 2021-09-02 DIAGNOSIS — N18.32 CHRONIC KIDNEY DISEASE, STAGE 3B (HCC): ICD-10-CM

## 2021-09-02 PROCEDURE — 99214 OFFICE O/P EST MOD 30 MIN: CPT | Performed by: FAMILY MEDICINE

## 2021-09-02 RX ORDER — CITALOPRAM 10 MG/1
10 TABLET ORAL DAILY
Qty: 90 TABLET | Refills: 1 | Status: SHIPPED | OUTPATIENT
Start: 2021-09-02 | End: 2022-01-13

## 2021-09-02 RX ORDER — CYANOCOBALAMIN (VITAMIN B-12) 250 MCG
250 TABLET ORAL DAILY
Qty: 90 EACH | Refills: 1 | Status: SHIPPED | OUTPATIENT
Start: 2021-09-02 | End: 2022-03-04 | Stop reason: SDUPTHER

## 2021-09-02 RX ORDER — MEMANTINE HYDROCHLORIDE 10 MG/1
10 TABLET ORAL DAILY
Qty: 90 TABLET | Refills: 1 | Status: SHIPPED | OUTPATIENT
Start: 2021-09-02 | End: 2022-03-04 | Stop reason: SDUPTHER

## 2021-09-02 NOTE — PATIENT INSTRUCTIONS
Alzheimer's Disease  Alzheimer's disease is a brain disease that affects memory, thinking, language, and behavior. People with Alzheimer's disease lose mental abilities, and the disease gets worse over time. Alzheimer's disease is a form of dementia.  What are the causes?  This condition develops when a protein called beta-amyloid forms deposits in the brain. It is not known what causes these deposits to form.  Alzheimer's disease may also be caused by a gene mutation that is inherited from one parent or both parents. A gene mutation is a harmful change in a gene. Not everyone who inherits the genetic mutation will get the disease.  What increases the risk?  You are more likely to develop this condition if you:  · Are older than age 65.  · Have a family history of dementia.  · Have had a brain injury.  · Have heart or blood vessel disease.  · Have had a stroke.  · Have high blood pressure or high cholesterol.  · Have diabetes.  What are the signs or symptoms?  Symptoms of this condition may happen in three stages, which often overlap.  Early stage  In this stage, you may continue to be independent. You may still be able to drive, work, and be social. Symptoms in this stage include:  · Minor memory problems, such as forgetting a name or what you read.  · Difficulty with:  ? Paying attention.  ? Communicating.  ? Doing familiar tasks.  ? Problem solving or doing calculations.  ? Following instructions.  ? Learning new things.  · Anxiety.  · Social withdrawal.  · Loss of motivation.  Moderate stage  In this stage, you will start to need care. Symptoms in this stage include:  · Difficulty with expressing thoughts.  · Memory loss that affects daily life. This can include forgetting:  ? Your address or phone number.  ? Recent events that have happened.  ? Parts of your personal history, such as where you went to school.  · Confusion about where you are or what time it is.  · Difficulty in judging distance.  · Changes in  personality, mood, and behavior. You may be molina, irritable, angry, frustrated, fearful, anxious, or suspicious.  · Poor reasoning and judgment.  · Delusions or hallucinations.  · Changes in sleep patterns.  · Wandering and getting lost, even in familiar places.  Severe stage  In the final stage, you will need help with your personal care and daily activities. Symptoms in this stage include:  · Worsening memory loss.  · Personality changes.  · Loss of awareness of your surroundings.  · Changes in physical abilities, including the ability to walk, sit, and swallow.  · Difficulty in communicating.  · Inability to control your bladder and bowels.  · Increasing confusion.  · Increasing behavior changes.  How is this diagnosed?  This condition is diagnosed by a health care provider who specializes in diseases of the nervous system (neurologist). Other causes of dementia may also be ruled out. Your health care provider will talk with you and your family, friends, or caregivers about your history and symptoms.  A thorough medical history will be taken, and you will have a physical exam and tests. Tests may include:  · Lab tests, such as blood or urine tests.  · Imaging tests, such as a CT scan, a PET scan, or an MRI.  · A lumbar puncture. This test involves removing and testing a small amount of the fluid that surrounds the brain and spinal cord.  · An electroencephalogram (EEG). In this test, small metal discs are used to measure electrical activity in the brain.  · Memory tests, cognitive tests, and neuropsychological tests. These tests evaluate brain function.  · Genetic testing may be done if you have early onset of the disease (before age 60) or if other family members have the disease.  How is this treated?  At this time, there is no treatment to cure Alzheimer's disease or stop it from getting worse. The goals of treatment are:  · To slow down symptoms of the disease, if possible.  · To manage behavioral  changes.  · To provide you with a safe environment.  · To help manage daily life for you and your caregivers.  The following treatment options are available:  · Medicines. Medicines may help to slow down memory loss and manage behavioral symptoms.  · Cognitive therapy. Cognitive therapy provides you with education, support, and memory aids. It is most helpful in the early stages of the condition.  · Counseling or spiritual guidance. It is normal to have a lot of feelings, including anger, relief, fear, and isolation. Counseling and guidance can help you deal with these feelings.  · Caregiving. This involves having caregivers help you with your daily activities.  · Family support groups. These provide education, emotional support, and information about community resources to family members who are taking care of you.  Follow these instructions at home:    Medicines  · Take over-the-counter and prescription medicines only as told by your health care provider.  · Use a pill organizer or pill reminder to help you manage your medicines.  · Avoid taking medicines that can affect thinking, such as pain medicines or sleeping medicines.  Lifestyle  · Make healthy lifestyle choices:  ? Be physically active as told by your health care provider. Regular exercise may help improve symptoms.  ? Do not use any products that contain nicotine or tobacco, such as cigarettes, e-cigarettes, and chewing tobacco. If you need help quitting, ask your health care provider.  ? Do not drink alcohol.  ? Eat a healthy diet.  ? Practice stress-management techniques when you get stressed.  ? Stay social.  · Drink enough fluid to keep your urine pale yellow.  · Make sure to get quality sleep.  ? Avoid taking long naps during the day. Take short naps of 30 minutes or less if needed.  ? Keep your sleeping area dark and cool.  ? Avoid exercising during the few hours before you go to bed.  ? Avoid caffeine products in the afternoon and  evening.  General instructions  · Work with your health care provider to determine what you need help with and what your safety needs are.  · If you were given a bracelet that identifies you as a person with memory loss or tracks your location, make sure to wear it at all times.  · Talk with your health care provider about whether it is safe for you to drive.  · Work with your family to make important decisions, such as advance directives, medical power of , or a living will.  · Keep all follow-up visits as told by your health care provider. This is important.  Where to find more information  · The Alzheimer's Association: Call the 24-hour helpline at 1-206.856.7512, or visit www.alz.org  Contact a health care provider if:  · You have nausea, vomiting, or trouble with eating.  · You have dizziness or weakness.  · You or your family members become concerned for your safety.  Get help right away if:  · You feel depressed or sad, or feel that you want to harm yourself.  · You develop chest pain or difficulty with breathing.  · You pass out.  If you ever feel like you may hurt yourself or others, or have thoughts about taking your own life, get help right away. You can go to your nearest emergency department or call:  · Your local emergency services (911 in the [place]  · A suicide crisis helpline, such as the National Suicide Prevention Lifeline at 1-155.288.4348. This is open 24 hours a day.  Summary  · Alzheimer's disease is a brain disease that affects memory, thinking, language, and behavior. Alzheimer's disease is a form of dementia.  · This condition is diagnosed by a specialist in diseases of the nervous system (neurologist).  · At this time, there is no treatment to cure Alzheimer's disease or stop it from getting worse. The goals of treatment are to slow memory loss and help you manage any symptoms.  · Work with your family to make important decisions, such as advance directives, medical power of  , or a living will.  This information is not intended to replace advice given to you by your health care provider. Make sure you discuss any questions you have with your health care provider.  Document Revised: 11/26/2019 Document Reviewed: 11/26/2019  Elsevier Patient Education © 2021 [employer]

## 2021-09-02 NOTE — PROGRESS NOTES
Teofilo Gomez presents to [facility].    Chief Complaint:  Follow up on dementia, anxiety, B12, kidney disease, other issues    Subjective       History of Present Illness:  Francisco is in today for follow-up on his usual care.  He has a history of Alzheimer's disease for which he currently remains on generic Namenda.  He says that he is doing okay with his memory, but his wife might disagree with that assessment.  He is not driving at this point.  He was previously on generic Aricept, but he could not tolerate it.  He does have some issues with agitation, but overall his symptoms are relatively well controlled.    Francisco also has a history of B12 deficiency.  He is not taking any injection for this at this time.    He also has hypertension and chronic renal insufficiency.  His creatinine has been stable most recently.  He had recent testing ordered by cardiology that showed a creatinine of 1.69.    He has had some issues in the past with the prostate, but he is doing relatively well overall at this time.      Review of Systems:  Review of Systems   Constitutional: Negative for chills and fever.   Respiratory: Negative for cough and shortness of breath.    Cardiovascular: Negative for chest pain and palpitations.   Gastrointestinal: Negative for abdominal pain, nausea and vomiting.        Objective   Medical History:  Past Medical History:   • Abnormal ECG   • Alzheimer disease (CMS/HCC)   • Colon polyp   • GERD (gastroesophageal reflux disease)   • Hypertension   • Lipoma    LLQ   • Shoulder pain    Right   • Tendonitis of shoulder     Past Surgical History:   • COLON SURGERY   • KNEE SURGERY   • KNEE SURGERY   • SHOULDER SURGERY   • SHOULDER SURGERY   • TONSILLECTOMY      Family History   Problem Relation Age of Onset   • Alzheimer's disease Mother    • Heart disease Mother    • Cancer Father         Unspecified - mouth ca   • Colon cancer Father      Social History     Tobacco Use  "  • Smoking status: Never Smoker   • Smokeless tobacco: Never Used   Substance Use Topics   • Alcohol use: Never       Health Maintenance Due   Topic Date Due   • COVID-19 Vaccine (1) Never done   • TDAP/TD VACCINES (1 - Tdap) Never done   • ZOSTER VACCINE (1 of 2) Never done   • Pneumococcal Vaccine 65+ (1 of 1 - PPSV23) Never done          There is no immunization history on file for this patient.    No Known Allergies     Medications:  Current Outpatient Medications on File Prior to Visit   Medication Sig   • aspirin 81 MG EC tablet Take 81 mg by mouth Daily.   • lisinopril (PRINIVIL,ZESTRIL) 5 MG tablet Take 1 tablet by mouth Daily.   • [DISCONTINUED] citalopram (CeleXA) 10 MG tablet Take 1 tablet by mouth once daily   • [DISCONTINUED] memantine (Namenda) 10 MG tablet Take 10 mg by mouth Daily.     No current facility-administered medications on file prior to visit.       Vital Signs:   /84 (BP Location: Right arm, Patient Position: Sitting)   Pulse 67   Temp 98.1 °F (36.7 °C) (Oral)   Ht 175.3 cm (69\")   Wt 65.7 kg (144 lb 12.8 oz)   BMI 21.38 kg/m²       Physical Exam:  Physical Exam  Vitals reviewed.   Constitutional:       General: He is not in acute distress.     Appearance: He is not ill-appearing.   Eyes:      Pupils: Pupils are equal, round, and reactive to light.   Neck:      Comments: No thyromegaly  Cardiovascular:      Rate and Rhythm: Normal rate and regular rhythm.   Pulmonary:      Effort: Pulmonary effort is normal.      Breath sounds: Normal breath sounds.   Abdominal:      General: There is no distension.      Palpations: Abdomen is soft.      Tenderness: There is no abdominal tenderness.   Musculoskeletal:      Cervical back: Neck supple.   Lymphadenopathy:      Cervical: No cervical adenopathy.   Skin:     Findings: No lesion or rash.   Neurological:      Mental Status: He is alert. Mental status is at baseline.         Result Review      The following data was reviewed by Mohinder" Ross Hutchison MD on 09/02/2021.  Lab Results   Component Value Date    WBC 5.12 05/28/2021    HGB 12.60 (L) 05/28/2021    HCT 39.0 (L) 05/28/2021    MCV 92.9 05/28/2021    .00 05/28/2021     Lab Results   Component Value Date    BUN 13 05/28/2021    CREATININE 1.68 (H) 05/28/2021    BCR 8 05/28/2021    K 4.1 05/28/2021    CO2 25 05/28/2021    CALCIUM 9.1 05/28/2021    ALBUMIN 4.2 05/28/2021    LABIL2 1.4 05/28/2021    AST 17 05/28/2021    ALT 10 05/28/2021     No results found for: CHOL, CHLPL, TRIG, HDL, LDL, LDLDIRECT  No results found for: TSH  No results found for: HGBA1C  Lab Results   Component Value Date    PSA 2.28 05/28/2021    PSA 2.57 07/12/2019     In addition to the above, recent basic metabolic panel done at [facility] is noted.         Assessment and Plan:   Today, we have again reviewed his care.  He seems well overall.  No short-term changes in his prescription medicines will be made other than the addition of a moderate dose B12 supplement.  Again, his recent kidney function was stable.  His blood pressure is relatively well controlled.  We will see him in 6 months or so.  At the end of the visit, his wife noted that he has had some fairly consistent issue with getting strangled when he swallows.  We will move forward with swallowing study as noted below.       Diagnoses and all orders for this visit:    1. Late onset Alzheimer's dementia without behavioral disturbance (CMS/HCC) (Primary)  -     citalopram (CeleXA) 10 MG tablet; Take 1 tablet by mouth Daily.  Dispense: 90 tablet; Refill: 1  -     memantine (Namenda) 10 MG tablet; Take 1 tablet by mouth Daily.  Dispense: 90 tablet; Refill: 1    2. Vitamin B12 deficiency anemia due to intrinsic factor deficiency  -     cyanocobalamin (VITAMIN B-12) 250 MCG tablet; Take 1 tablet by mouth Daily.  Dispense: 90 each; Refill: 1    3. Chronic kidney disease, stage 3b (CMS/HCC)    4. BPH without obstruction/lower urinary tract  symptoms    5. Oropharyngeal dysphagia  -     FL Video Swallow With Speech Single Contrast; Future          Follow Up   Return in about 6 months (around 3/2/2022).  Patient was given instructions and counseling regarding his condition or for health maintenance advice. Please see specific information pulled into the AVS if appropriate.

## 2021-09-07 ENCOUNTER — OFFICE VISIT (OUTPATIENT)
Dept: CARDIOLOGY | Facility: CLINIC | Age: 81
End: 2021-09-07

## 2021-09-07 VITALS
BODY MASS INDEX: 21.67 KG/M2 | HEIGHT: 68 IN | DIASTOLIC BLOOD PRESSURE: 80 MMHG | HEART RATE: 74 BPM | SYSTOLIC BLOOD PRESSURE: 128 MMHG | WEIGHT: 143 LBS

## 2021-09-07 DIAGNOSIS — I50.22 CHRONIC SYSTOLIC HEART FAILURE (HCC): Primary | ICD-10-CM

## 2021-09-07 DIAGNOSIS — R06.02 SHORTNESS OF BREATH: ICD-10-CM

## 2021-09-07 DIAGNOSIS — I10 ESSENTIAL HYPERTENSION: ICD-10-CM

## 2021-09-07 DIAGNOSIS — R07.2 PRECORDIAL PAIN: ICD-10-CM

## 2021-09-07 PROCEDURE — 99214 OFFICE O/P EST MOD 30 MIN: CPT | Performed by: NURSE PRACTITIONER

## 2021-09-07 RX ORDER — METOPROLOL SUCCINATE 25 MG/1
25 TABLET, EXTENDED RELEASE ORAL DAILY
Qty: 90 TABLET | Refills: 3 | Status: SHIPPED | OUTPATIENT
Start: 2021-09-07 | End: 2022-03-04 | Stop reason: SDUPTHER

## 2021-09-07 NOTE — PROGRESS NOTES
Chief Complaint  Follow-up (No complaints)    Subjective            Teofilo Gomez presents to [facility]  History of Present Illness    Mr. Gomez is an 80-year-old white/ male here today for follow-up evaluation and management of chest pain, shortness of breath, hypertension.  Since his last visit he had an echocardiogram revealing mildly reduced systolic function with an ejection fraction of 40 to 45%, grade 1 diastolic dysfunction, trivial mitral, tricuspid, aortic regurgitation.  He also had stress imaging which was normal.  Today he reports that his chest pain and shortness of breath have completely resolved.  He has been taking lisinopril which was prescribed at the last visit he has been tolerating this well.    PMH  Past Medical History:   Diagnosis Date   • Abnormal ECG    • Alzheimer disease (CMS/HCC)    • Colon polyp    • GERD (gastroesophageal reflux disease)    • Hypertension    • Lipoma 07/17/2014    LLQ   • Shoulder pain     Right   • Tendonitis of shoulder 07/10/2014         SURGICALHX  Past Surgical History:   Procedure Laterality Date   • COLON SURGERY     • KNEE SURGERY     • KNEE SURGERY     • SHOULDER SURGERY     • SHOULDER SURGERY     • TONSILLECTOMY          SOC  Social History     Socioeconomic History   • Marital status:      Spouse name: Not on file   • Number of children: Not on file   • Years of education: Not on file   • Highest education level: Not on file   Tobacco Use   • Smoking status: Never Smoker   • Smokeless tobacco: Never Used   Vaping Use   • Vaping Use: Never used   Substance and Sexual Activity   • Alcohol use: Never   • Drug use: Never   • Sexual activity: Defer         FAMHX  Family History   Problem Relation Age of Onset   • Alzheimer's disease Mother    • Heart disease Mother    • Cancer Father         Unspecified - mouth ca   • Colon cancer Father           ALLERGY  No Known Allergies     MEDSCURRENT    Current Outpatient  "Medications:   •  aspirin 81 MG EC tablet, Take 81 mg by mouth Daily., Disp: , Rfl:   •  citalopram (CeleXA) 10 MG tablet, Take 1 tablet by mouth Daily., Disp: 90 tablet, Rfl: 1  •  cyanocobalamin (VITAMIN B-12) 250 MCG tablet, Take 1 tablet by mouth Daily., Disp: 90 each, Rfl: 1  •  lisinopril (PRINIVIL,ZESTRIL) 5 MG tablet, Take 1 tablet by mouth Daily., Disp: 90 tablet, Rfl: 3  •  memantine (Namenda) 10 MG tablet, Take 1 tablet by mouth Daily., Disp: 90 tablet, Rfl: 1  •  metoprolol succinate XL (TOPROL-XL) 25 MG 24 hr tablet, Take 1 tablet by mouth Daily., Disp: 90 tablet, Rfl: 3      Review of Systems   Cardiovascular: Negative for chest pain, dyspnea on exertion, irregular heartbeat and leg swelling.   Respiratory: Negative for shortness of breath.         Objective     /80 (Patient Position: Sitting)   Pulse 74   Ht 172.7 cm (68\")   Wt 64.9 kg (143 lb)   BMI 21.74 kg/m²       General Appearance:   · well developed  · well nourished  HENT:   · oropharynx moist  · lips not cyanotic  Neck:  · thyroid not enlarged  · supple  Respiratory:  · no respiratory distress  · normal breath sounds  · no rales  Cardiovascular:  · no jugular venous distention  · regular rhythm  · apical impulse normal  · S1 normal, S2 normal  · no S3, no S4   · no murmur  · no rub, no thrill  · carotid pulses normal; no bruit  · pedal pulses normal  · lower extremity edema: none    Musculoskeletal:  · no clubbing of fingers.   · normocephalic, head atraumatic  Skin:   · warm, dry  Psychiatric:  · judgement and insight appropriate  · normal mood and affect      Result Review :         CMP    CMP 11/24/20 3/3/21 5/28/21   Glucose 89 73 87   BUN 12 12 13   Creatinine 1.52 (A) 1.52 (A) 1.68 (A)   Sodium 135 141 142   Potassium 3.7 3.8 4.1   Chloride 99 106 106   Calcium 9.2 9.1 9.1   Albumin 3.9  4.2   Total Bilirubin 0.34  0.60   Alkaline Phosphatase 79  100   AST (SGOT) 15  17   ALT (SGPT) 8 (A)  10   (A) Abnormal value     "   Comments are available for some flowsheets but are not being displayed.           CBC    CBC 11/24/20 3/3/21 5/28/21   WBC 5.75 5.28 5.12   RBC 4.07 (A) 3.98 (A) 4.20 (A)   Hemoglobin 12.20 (A) 12.10 (A) 12.60 (A)   Hematocrit 37.6 (A) 37.3 (A) 39.0 (A)   MCV 92.4 93.7 92.9   MCH 30.0 30.4 30.0   MCHC 32.4 (A) 32.4 (A) 32.3 (A)   RDW 12.1 12.5 12.1   Platelets 203.00 185.00 180.00   (A) Abnormal value            CBC w/diff    CBC w/Diff 11/24/20 3/3/21 5/28/21   WBC 5.75 5.28 5.12   RBC 4.07 (A) 3.98 (A) 4.20 (A)   Hemoglobin 12.20 (A) 12.10 (A) 12.60 (A)   Hematocrit 37.6 (A) 37.3 (A) 39.0 (A)   MCV 92.4 93.7 92.9   MCH 30.0 30.4 30.0   MCHC 32.4 (A) 32.4 (A) 32.3 (A)   RDW 12.1 12.5 12.1   Platelets 203.00 185.00 180.00   (A) Abnormal value              Data reviewed: Echocardiogram, stress imaging, laboratory studies reviewed as above.         Teofilo Gomez  reports that he has never smoked. He has never used smokeless tobacco.. I have educated him on the risk of diseases from using tobacco products such as cancer, COPD and heart disease.          Assessment and Plan        ASSESSMENT:  Encounter Diagnoses   Name Primary?   • Chronic systolic heart failure (CMS/HCC) Yes   • Essential hypertension    • Precordial pain    • Shortness of breath          PLAN:    1.  Systolic heart failure-clinically stable.  Ejection fraction mildly reduced.  Continue ACE inhibitor.  Starting beta-blocker today.   2.  Hypertension-controlled.  Continue current regimen.   3.  Symptoms have resolved no further work-up needed at this time.    Teofilo is agreeable to the plan of care, all questions answered, follow-up in 3 months.      Patient was given instructions and counseling regarding his condition or for health maintenance advice. Please see specific information pulled into the AVS if appropriate.           Frida Whalen, APRN   9/7/2021  11:57 EDT

## 2021-09-23 ENCOUNTER — APPOINTMENT (OUTPATIENT)
Dept: GENERAL RADIOLOGY | Facility: HOSPITAL | Age: 81
End: 2021-09-23

## 2021-09-24 ENCOUNTER — HOSPITAL ENCOUNTER (OUTPATIENT)
Dept: GENERAL RADIOLOGY | Facility: HOSPITAL | Age: 81
Discharge: HOME | End: 2021-09-24
Admitting: FAMILY MEDICINE

## 2021-09-24 DIAGNOSIS — R13.12 OROPHARYNGEAL DYSPHAGIA: ICD-10-CM

## 2021-09-24 PROCEDURE — A9270 NON-COVERED ITEM OR SERVICE: HCPCS | Performed by: FAMILY MEDICINE

## 2021-09-24 PROCEDURE — 63710000001 BARIUM SULFATE 40 % SUSPENSION: Performed by: FAMILY MEDICINE

## 2021-09-24 PROCEDURE — 92611 MOTION FLUOROSCOPY/SWALLOW: CPT

## 2021-09-24 PROCEDURE — 74230 X-RAY XM SWLNG FUNCJ C+: CPT

## 2021-09-24 PROCEDURE — 63710000001 BARIUM SULFATE 40 % RECONSTITUTED SUSPENSION: Performed by: FAMILY MEDICINE

## 2021-09-24 RX ADMIN — BARIUM SULFATE 55 ML: 0.81 POWDER, FOR SUSPENSION ORAL at 10:15

## 2021-09-24 RX ADMIN — BARIUM SULFATE 50 ML: 400 SUSPENSION ORAL at 10:15

## 2021-09-24 NOTE — MBS/VFSS/FEES
[facility] BARIUM SWALLOW STUDY - Speech Language Pathology   Swallow Initial Evaluation  Archana     Patient Name: Teofilo Gomez  : 1940  MRN: [id number]  Today's Date: 2021               Admit Date: 2021    Visit Dx:     ICD-10-CM ICD-9-CM   1. Oropharyngeal dysphagia  R13.12 787.22     Patient Active Problem List   Diagnosis   • Precordial pain     Past Medical History:   Diagnosis Date   • Abnormal ECG    • Alzheimer disease (CMS/HCC)    • Colon polyp    • GERD (gastroesophageal reflux disease)    • Hypertension    • Lipoma 2014    LLQ   • Shoulder pain     Right   • Tendonitis of shoulder 07/10/2014     Past Surgical History:   Procedure Laterality Date   • COLON SURGERY     • KNEE SURGERY     • KNEE SURGERY     • SHOULDER SURGERY     • SHOULDER SURGERY     • TONSILLECTOMY       MODIFIED BARIUM SWALLOW STUDY: SPEECH PATHOLOGY REPORT        DATE OF SERVICE: 2021    PERTINENT INFORMATION:  Mr. Goemz is a 80 year old male with complaint of dysphagia.    He was referred for an MBSS by Dr. Mohinder Hutchison to rule out aspiration as well as to determine appropriate treatment plan for this patient.      PROCEDURE:    Mr. Gomez was alert and cooperative.  The patient was viewed in lateral plane.  The following Ba consistencies were administered: Thin liquid, nectar thick liquid, barium paste, barium mixed with applesauce, barium mixed with cracker.  The following compensatory swallowing strategies were performed: Bolus modification, cyclic ingestion.      RESULTS:    1.  Nectar thick liquid by cup.  Swallow completed.  Residue noted in the piriform sinus as well as what is visualized is a possible Zenker's diverticulum around area of C6.  2.  Thin liquid by cup.  Swallow completed.  Continued filling of Zenker's diverticulum.  3.  Purée.  Lingual pumping with swallow completed.  4.  Pudding consistency.  Lingual pumping with swallow completed.  5.  Solid.  Chewing with lingual pumping.  Spillage  to the vallecula with swallow completed.  6.  Thin barium by straw.  Swallows completed.  Increased filling of Zenker's diverticulum.  Backflow noted out of diverticulum to top of cricopharyngeal opening.      IMPRESSIONS:    Mr. Gomez demonstrated oral pharyngeal swallow grossly within functional limits.  No aspiration penetration observed during the study.  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  Please see radiologist report for further details.  FUNCTIONAL DEFICIT: Patient scored level 7 of 7 on Functional Communication Measures for swallowing indicating a 0% limitation in function for current status, goal status, and discharge status.      RECOMMENDATIONS:   1.  Diet: Regular solids and thin liquids.        YESPatient/responsible party agrees with the plan of care and has been informed of all alternatives, risks and benefits.    Thank you for this referral.  SLP Recommendation and Plan                                                                        EDUCATION  The patient has been educated in the following areas:   Dysphagia (Swallowing Impairment).              Time Calculation:       Therapy Charges for Today     Code Description Service Date Service Provider Modifiers Qty    05293775050 HC ST MOTION FLUORO EVAL SWALLOW 6 9/24/2021 Corinna Jones, SLP GN 1               SUSANA Griffin  9/24/2021

## 2021-09-27 ENCOUNTER — TELEPHONE (OUTPATIENT)
Dept: FAMILY MEDICINE CLINIC | Age: 81
End: 2021-09-27

## 2021-09-27 DIAGNOSIS — Q39.6 DIVERTICULUM OF ESOPHAGUS: Primary | ICD-10-CM

## 2021-09-27 NOTE — TELEPHONE ENCOUNTER
Caller: RISHI WALLACE    Relationship: Emergency Contact    Best call back number: [phone]    Who are you requesting to speak with (clinical staff, provider,  specific staff member): STACEY    What was the call regarding: PATIENTS WIFE STATED THEY MISSED A CALL. ATTEMPTED TO WARM TRANSFER. PLEASE CALL PATIENTS WIFE BACK.

## 2021-09-27 NOTE — TELEPHONE ENCOUNTER
----- Message from Mohinder Hutchison MD sent at 9/24/2021  7:35 PM EDT -----  Please let her Francisco's wife know that the swallowing study does not show obvious aspiration.  I believe speech therapy worked with them while he had the test.  However, they do see a diverticulum off the esophagus that is fairly large.  This may not be of concern, and no treatment may be recommended, but I would advise referral to general surgery for evaluation.  Please refer to general surgeon of choice.  Thanks.

## 2021-10-05 ENCOUNTER — OFFICE VISIT (OUTPATIENT)
Dept: SURGERY | Facility: CLINIC | Age: 81
End: 2021-10-05

## 2021-10-05 VITALS — RESPIRATION RATE: 16 BRPM | BODY MASS INDEX: 22.98 KG/M2 | WEIGHT: 143 LBS | HEIGHT: 66 IN

## 2021-10-05 DIAGNOSIS — K22.5 ZENKER'S DIVERTICULUM: Primary | ICD-10-CM

## 2021-10-05 PROCEDURE — 99203 OFFICE O/P NEW LOW 30 MIN: CPT | Performed by: SURGERY

## 2021-10-05 NOTE — PROGRESS NOTES
Inpatient History and Physical Surgical Orders    Preadmission Location:   Preadmission Time:  Facility:  Surgery Date:  Surgery Time:  Preadmission Test date:     Chief Complaint  Outpatient History and Physical / Surgical Orders    Primary Care Provider: Mohinder Hutchison MD    Referring Provider: Mohinder Hutchison,*    Subjective      Patient Name: Teofilo Gomez : 1940    HPI  The patient is an 80-year-old gentleman who was referred for complaints of dysphagia.  He has episodes where it seems like he gets food stuck in his cervical esophagus.  He recently had a video swallowing study that did reveal evidence of a Zenker's diverticulum at the C6-C7 level.    Past History:  Medical History: has a past medical history of Abnormal ECG, Alzheimer disease (HCC), Colon polyp, GERD (gastroesophageal reflux disease), Hypertension, Lipoma (2014), Shoulder pain, and Tendonitis of shoulder (07/10/2014).   Surgical History: has a past surgical history that includes Colon surgery; Knee surgery; Shoulder surgery; Tonsillectomy; Knee surgery; Shoulder surgery; and Colonoscopy.   Family History: family history includes Alzheimer's disease in his mother; Cancer in his father; Colon cancer in his father; Heart disease in his mother.   Social History: reports that he has never smoked. He has never used smokeless tobacco. He reports that he does not drink alcohol and does not use drugs.  Allergies: Patient has no known allergies.       Current Outpatient Medications:   •  aspirin 81 MG EC tablet, Take 81 mg by mouth Daily., Disp: , Rfl:   •  citalopram (CeleXA) 10 MG tablet, Take 1 tablet by mouth Daily., Disp: 90 tablet, Rfl: 1  •  cyanocobalamin (VITAMIN B-12) 250 MCG tablet, Take 1 tablet by mouth Daily., Disp: 90 each, Rfl: 1  •  lisinopril (PRINIVIL,ZESTRIL) 5 MG tablet, Take 1 tablet by mouth Daily., Disp: 90 tablet, Rfl: 3  •  memantine (Namenda) 10 MG tablet, Take 1 tablet by mouth Daily., Disp: 90  "tablet, Rfl: 1  •  metoprolol succinate XL (TOPROL-XL) 25 MG 24 hr tablet, Take 1 tablet by mouth Daily., Disp: 90 tablet, Rfl: 3       Objective   Vital Signs:   Resp 16   Ht 167.6 cm (66\")   Wt 64.9 kg (143 lb)   BMI 23.08 kg/m²       Physical Exam  Vitals and nursing note reviewed.   Constitutional:       Appearance: Normal appearance. The patient is well-developed.   Cardiovascular:      Rate and Rhythm: Normal rate and regular rhythm.   Pulmonary:      Effort: Pulmonary effort is normal.      Breath sounds: Normal air entry.   Abdominal:      General: Bowel sounds are normal.      Palpations: Abdomen is soft.      Skin:     General: Skin is warm and dry.   Neurological:      Mental Status: The patient is alert and oriented to person, place, and time.      Motor: Motor function is intact.   Psychiatric:         Mood and Affect: Mood normal.       Result Review :               Assessment and Plan   Diagnoses and all orders for this visit:    1. Zenker's diverticulum (Primary)    We will refer him to the otolaryngologist in [place] for a consultation.  We will see what they recommend in terms of his options for surgery.    I  Hebert Peña MD  10/05/2021    "

## 2021-10-14 ENCOUNTER — OFFICE VISIT (OUTPATIENT)
Dept: OTOLARYNGOLOGY | Facility: CLINIC | Age: 81
End: 2021-10-14

## 2021-10-14 VITALS — WEIGHT: 145 LBS | TEMPERATURE: 97.9 F | BODY MASS INDEX: 23.3 KG/M2 | HEIGHT: 66 IN

## 2021-10-14 DIAGNOSIS — K22.5 ZENKER'S DIVERTICULUM: Primary | ICD-10-CM

## 2021-10-14 PROCEDURE — 99204 OFFICE O/P NEW MOD 45 MIN: CPT | Performed by: OTOLARYNGOLOGY

## 2021-11-18 NOTE — PROGRESS NOTES
Patient Name: Teofilo Gomez   Visit Date: 10/14/2021   Patient ID: [id number]  Provider: Alexander De La Cruz MD    Sex: male  Location: [facility]   YOB: 1940  Location Address: [address] [place] [address], [address],?[place]?[id number]    Primary Care Provider Mohinder Hutchison MD  Location Phone: [phone]    Referring Provider: Hebert Peña MD        Chief Complaint  Difficulty Swallowing    Subjective    History of Present Illness  Teofilo Gomez is a 80 y.o. male who presents to [facility] today as a consult from Hebert Peña MD.    He presents the clinic today for accompanied by his wife for evaluation of some swallowing difficulty that he notes intermittently.  He states that this is been going on for many years, and the nature of the condition is mild.  He denies any weight loss.  A swallow study was ordered and performed and showed a Zenker's diverticulum currently measuring approximately 4.2 x 1.8 cm along the C6-7 level of the esophagus.  I reviewed the imaging today with him and his wife and discussed the findings and implications.    Past Medical History:   Diagnosis Date   • Abnormal ECG    • Alzheimer disease (HCC)    • Colon polyp    • GERD (gastroesophageal reflux disease)    • Hypertension    • Lipoma 07/17/2014    LLQ   • Shoulder pain     Right   • Tendonitis of shoulder 07/10/2014       Past Surgical History:   Procedure Laterality Date   • COLON SURGERY     • COLONOSCOPY     • KNEE SURGERY     • KNEE SURGERY     • SHOULDER SURGERY     • SHOULDER SURGERY     • TONSILLECTOMY           Current Outpatient Medications:   •  aspirin 81 MG EC tablet, Take 81 mg by mouth Daily., Disp: , Rfl:   •  citalopram (CeleXA) 10 MG tablet, Take 1 tablet by mouth Daily., Disp: 90 tablet, Rfl: 1  •  cyanocobalamin (VITAMIN B-12) 250 MCG tablet, Take 1 tablet by mouth Daily., Disp: 90 each, Rfl: 1  •  lisinopril (PRINIVIL,ZESTRIL) 5 MG  "tablet, Take 1 tablet by mouth Daily., Disp: 90 tablet, Rfl: 3  •  memantine (Namenda) 10 MG tablet, Take 1 tablet by mouth Daily., Disp: 90 tablet, Rfl: 1  •  metoprolol succinate XL (TOPROL-XL) 25 MG 24 hr tablet, Take 1 tablet by mouth Daily., Disp: 90 tablet, Rfl: 3     No Known Allergies    Family History   Problem Relation Age of Onset   • Alzheimer's disease Mother    • Heart disease Mother    • Cancer Father         Unspecified - mouth ca   • Colon cancer Father         Social History     Social History Narrative   • Not on file       Objective     Vital Signs:   Temp 97.9 °F (36.6 °C) (Temporal)   Ht 167.6 cm (66\")   Wt 65.8 kg (145 lb)   BMI 23.40 kg/m²       Physical Exam         Constitutional   Appearance  · : well developed, well-nourished, alert and in no acute distress, voice clear and strong    Head  Inspection  · : no deformities or lesions  Face  Inspection  · : No facial lesions; House-Brackmann I/VI bilaterally  Palpation  · : No TMJ crepitus nor  muscle tenderness bilaterally    Eyes  Vision  Visual Fields  · : Extraocular movements are intact. No spontaneous or gaze-induced nystagmus.  Conjunctivae  · : clear  Sclerae  · : clear  Pupils and Irises  · : pupils equal, round, and reactive to light.     Ears, Nose, Mouth and Throat    Ears    External Ears  · : appearance within normal limits, no lesions present  Otoscopic Examination  · : Tympanic membrane appearance within normal limits bilaterally without perforations, well-aerated middle ears  Hearing  · : intact to conversational voice both ears  Tunning fork testing:     :    Nose    External Nose  · : appearance normal  Intranasal Exam  · : mucosa within normal limits, vestibules normal, no intranasal lesions present, septum midline, sinuses non tender to percussion  Oral Cavity    Oral Mucosa  · : oral mucosa normal without pallor or cyanosis  Lips  · : lip appearance normal  Teeth  · : normal dentition for age  Gums  · : gums " pink, non-swollen, no bleeding present  Tongue  · : tongue appearance normal; normal mobility  Palate  · : hard palate normal, soft palate appearance normal with symmetric mobility    Throat    Oropharynx  · : no inflammation or lesions present, tonsils within normal limits  Hypopharynx  · : appearance within normal limits, superior epiglottis within normal limits  Larynx  · : appearance within normal limits, vocal cords within normal limits, no lesions present    Neck  Inspection/Palpation  · : normal appearance, no masses or tenderness, trachea midline; thyroid size normal, nontender, no nodules or masses present on palpation    Respiratory  Respiratory Effort  · : breathing unlabored  Inspection of Chest  · : normal appearance, no retractions    Cardiovascular  Heart  · : regular rate and rhythm    Lymphatic  Neck  · : no lymphadenopathy present  Supraclavicular Nodes  · : no lymphadenopathy present  Preauricular Nodes  · : no lymphadenopathy present    Skin and Subcutaneous Tissue  General Inspection  · : Regarding face and neck - there are no rashes present, no lesions present, and no areas of discoloration    Neurologic  Cranial Nerves  · : cranial nerves II-XII are grossly intact bilaterally  Gait and Station  · : normal gait, able to stand without diffculty    Psychiatric  Judgement and Insight  · : judgment and insight intact  Mood and Affect  · : mood normal, affect appropriate          Assessment and Plan    Diagnoses and all orders for this visit:    1. Zenker's diverticulum (Primary)    Examination today revealed essentially normal head and neck exam.  Imaging was reviewed and based on the appearance, I do think he has a Zenker's diverticulum.  I think this is least partially responsible for swallowing difficulty.  I discussed treatment options for this including esophagoscopy with endoscopic takedown of Zenker's diverticulum.  He understands, and would like to think about this before proceeding as his  symptoms seem to be mild.  I have asked him to contact me should there be any worsening or should he change his mind regarding treating the Zenker's diverticulum.    Follow Up   No follow-ups on file.  Patient was given instructions and counseling regarding his condition or for health maintenance advice. Please see specific information pulled into the AVS if appropriate.

## 2021-12-08 ENCOUNTER — OFFICE VISIT (OUTPATIENT)
Dept: CARDIOLOGY | Facility: CLINIC | Age: 81
End: 2021-12-08

## 2021-12-08 VITALS
HEIGHT: 66 IN | SYSTOLIC BLOOD PRESSURE: 147 MMHG | HEART RATE: 55 BPM | WEIGHT: 140 LBS | BODY MASS INDEX: 22.5 KG/M2 | DIASTOLIC BLOOD PRESSURE: 91 MMHG

## 2021-12-08 DIAGNOSIS — I10 ESSENTIAL HYPERTENSION: ICD-10-CM

## 2021-12-08 DIAGNOSIS — R06.02 SHORTNESS OF BREATH: ICD-10-CM

## 2021-12-08 DIAGNOSIS — R07.2 PRECORDIAL PAIN: ICD-10-CM

## 2021-12-08 DIAGNOSIS — I50.22 CHRONIC SYSTOLIC HEART FAILURE (HCC): Primary | ICD-10-CM

## 2021-12-08 PROCEDURE — 99214 OFFICE O/P EST MOD 30 MIN: CPT | Performed by: NURSE PRACTITIONER

## 2021-12-08 NOTE — PROGRESS NOTES
Chief Complaint  Chronic systolic heart failure (FOLLOW UP), Essential hypertension, and Shortness of Breath    Subjective            Teofilo Gomez presents to [facility]  History of Present Illness    Teofilo Gomez is an 81-year-old white/ male here today for follow-up evaluation management of hypertension, reduced systolic function, chest pain, and shortness of breath.  Earlier this year, Teofilo had an echocardiogram which revealed mildly reduced systolic function with an ejection fraction of 40 to 45%, grade 1 diastolic dysfunction and no significant valvular dysfunction.  Stress imaging was also completed which revealed no evidence of myocardial ischemia.  Today, Teofilo reports he is feeling very well.  He denies any significant chest pain or shortness of breath.  He is accompanied by family today, who report worsening Alzheimer's disease.  Family has been helping with medications and ensuring compliance.    PMH  Past Medical History:   Diagnosis Date   • Abnormal ECG    • Alzheimer disease (HCC)    • Colon polyp    • GERD (gastroesophageal reflux disease)    • Hypertension    • Lipoma 07/17/2014    LLQ   • Shoulder pain     Right   • Tendonitis of shoulder 07/10/2014         SURGICALHX  Past Surgical History:   Procedure Laterality Date   • COLON SURGERY     • COLONOSCOPY     • KNEE SURGERY     • KNEE SURGERY     • SHOULDER SURGERY     • SHOULDER SURGERY     • TONSILLECTOMY          SOC  Social History     Socioeconomic History   • Marital status:    Tobacco Use   • Smoking status: Never Smoker   • Smokeless tobacco: Never Used   Vaping Use   • Vaping Use: Never used   Substance and Sexual Activity   • Alcohol use: Never   • Drug use: Never   • Sexual activity: Defer         FAMHX  Family History   Problem Relation Age of Onset   • Alzheimer's disease Mother    • Heart disease Mother    • Cancer Father         Unspecified - mouth ca   • Colon cancer Father      "      ALLERGY  No Known Allergies     MEDSCURRENT    Current Outpatient Medications:   •  aspirin 81 MG EC tablet, Take 81 mg by mouth Daily., Disp: , Rfl:   •  citalopram (CeleXA) 10 MG tablet, Take 1 tablet by mouth Daily., Disp: 90 tablet, Rfl: 1  •  cyanocobalamin (VITAMIN B-12) 250 MCG tablet, Take 1 tablet by mouth Daily., Disp: 90 each, Rfl: 1  •  lisinopril (PRINIVIL,ZESTRIL) 5 MG tablet, Take 1 tablet by mouth Daily., Disp: 90 tablet, Rfl: 3  •  memantine (Namenda) 10 MG tablet, Take 1 tablet by mouth Daily., Disp: 90 tablet, Rfl: 1  •  metoprolol succinate XL (TOPROL-XL) 25 MG 24 hr tablet, Take 1 tablet by mouth Daily., Disp: 90 tablet, Rfl: 3      Review of Systems   Constitutional: Negative for malaise/fatigue.   Cardiovascular: Negative for chest pain, dyspnea on exertion, irregular heartbeat, leg swelling, near-syncope, orthopnea and syncope.   Respiratory: Negative for shortness of breath.    Endocrine: Positive for cold intolerance.   Psychiatric/Behavioral: Positive for memory loss.        Objective     /91   Pulse 55   Ht 167.6 cm (66\")   Wt 63.5 kg (140 lb)   BMI 22.60 kg/m²       General Appearance:   · well developed  · well nourished  HENT:   · oropharynx moist  · lips not cyanotic  Neck:  · thyroid not enlarged  · supple  Respiratory:  · no respiratory distress  · normal breath sounds  · no rales  Cardiovascular:  · no jugular venous distention  · regular rhythm  · apical impulse normal  · S1 normal, S2 normal  · no S3, no S4   · no murmur  · no rub, no thrill  · carotid pulses normal; no bruit  · pedal pulses normal  · lower extremity edema: none    Musculoskeletal:  · no clubbing of fingers.   · normocephalic, head atraumatic  Skin:   · warm, dry  Psychiatric:  · judgement and insight appropriate  · normal mood and affect      Result Review :             Data reviewed: Cardiology studies Echocardiogram and stress testing reviewed as above.  Laboratory studies reviewed. "     Procedures      Teofilo Gomez  reports that he has never smoked. He has never used smokeless tobacco.. I have educated him on the risk of diseases from using tobacco products such as cancer, COPD and heart disease.              Assessment and Plan        ASSESSMENT:  Encounter Diagnoses   Name Primary?   • Chronic systolic heart failure (HCC) Yes   • Essential hypertension    • Shortness of breath    • Precordial pain          PLAN:    1.  Systolic heart failure-Teofilo is clinically stable and asymptomatic.  Recent echocardiogram revealed mildly reduced ejection fraction, 40 to 45%.  Continue ACE inhibitor and beta-blocker.  2.  Hypertension-controlled.  Continue current regimen.  3.  Shortness of breath-resolved.  4.  Precordial pain-resolved.  Stress test normal.  No further work-up necessary at this time.    Teofilo and family are agreeable to the plan of care and will follow up in 6 months unless problems arise.          Patient was given instructions and counseling regarding his condition or for health maintenance advice. Please see specific information pulled into the AVS if appropriate.           Frida Whalen, APRN   12/8/2021  10:20 EST

## 2021-12-15 ENCOUNTER — OFFICE VISIT (OUTPATIENT)
Dept: FAMILY MEDICINE CLINIC | Age: 81
End: 2021-12-15

## 2021-12-15 VITALS
BODY MASS INDEX: 22.82 KG/M2 | WEIGHT: 142 LBS | HEART RATE: 53 BPM | DIASTOLIC BLOOD PRESSURE: 82 MMHG | SYSTOLIC BLOOD PRESSURE: 152 MMHG | HEIGHT: 66 IN | OXYGEN SATURATION: 100 %

## 2021-12-15 DIAGNOSIS — G30.9 ALZHEIMER'S DEMENTIA WITH BEHAVIORAL DISTURBANCE, UNSPECIFIED TIMING OF DEMENTIA ONSET: ICD-10-CM

## 2021-12-15 DIAGNOSIS — R46.89 BEHAVIORAL CHANGE: Primary | ICD-10-CM

## 2021-12-15 DIAGNOSIS — F02.81 ALZHEIMER'S DEMENTIA WITH BEHAVIORAL DISTURBANCE, UNSPECIFIED TIMING OF DEMENTIA ONSET: ICD-10-CM

## 2021-12-15 PROBLEM — F02.818 ALZHEIMER'S DEMENTIA WITH BEHAVIORAL DISTURBANCE (HCC): Status: ACTIVE | Noted: 2021-12-15

## 2021-12-15 PROCEDURE — 99213 OFFICE O/P EST LOW 20 MIN: CPT | Performed by: NURSE PRACTITIONER

## 2021-12-15 NOTE — ASSESSMENT & PLAN NOTE
Referred to neurology for further evaluation. Discuss care with Dr. Hutchison who is his PCP. Could consider increasing the Namenda or the citalopram. Also rule out any acute underlying trigger to his recent increase in symptoms. Urinalysis is ordered and results are pending. Denies any fever or any signs of illness.

## 2021-12-15 NOTE — PROGRESS NOTES
"Chief Complaint  Alzheimer's Disease (worsening)    Subjective  Patient is an 81-year-old male who is here today with his wife. For dementia he takes Namenda 10 mg once per day as he had previous intolerance to Aricept. He also takes citalopram 10 mg once per day. Wife feels as if dementia is worsening. The other night when severe weather was in the area he kept wanting to walk outside. Wife states they had to put bells on the door to alert them when he was going outdoors. He is also continue to drive and drove to [place] on Monday. He is able to start the vehicles without using a key. He naps frequently during the day. Appetite is good and normal. Good control over urine and bowels. Seems more angry.        Teofilo Gomez presents to [facility]    Review of Systems   Constitutional: Negative.    Respiratory: Negative.    Cardiovascular: Negative.    Genitourinary: Negative.    Neurological: Negative.         Objective   Vital Signs:   Vitals:    12/15/21 1014   BP: 152/82   BP Location: Left arm   Patient Position: Sitting   Cuff Size: Adult   Pulse: 53   SpO2: 100%   Weight: 64.4 kg (142 lb)   Height: 167.6 cm (66\")      Physical Exam  Vitals reviewed.   Constitutional:       General: He is not in acute distress.     Appearance: Normal appearance. He is well-developed.   Cardiovascular:      Rate and Rhythm: Normal rate and regular rhythm.      Heart sounds: Normal heart sounds.   Pulmonary:      Effort: Pulmonary effort is normal.      Breath sounds: Normal breath sounds.   Musculoskeletal:      Right lower leg: No edema.      Left lower leg: No edema.   Skin:     General: Skin is warm and dry.   Neurological:      General: No focal deficit present.      Mental Status: He is alert.   Psychiatric:         Attention and Perception: Attention normal.         Mood and Affect: Mood and affect normal.         Behavior: Behavior normal.          Result Review :     CMP    CMP 3/3/21 " 5/28/21   Glucose 73 87   BUN 12 13   Creatinine 1.52 (A) 1.68 (A)   Sodium 141 142   Potassium 3.8 4.1   Chloride 106 106   Calcium 9.1 9.1   Albumin  4.2   Total Bilirubin  0.60   Alkaline Phosphatase  100   AST (SGOT)  17   ALT (SGPT)  10   (A) Abnormal value            CBC    CBC 3/3/21 5/28/21   WBC 5.28 5.12   RBC 3.98 (A) 4.20 (A)   Hemoglobin 12.10 (A) 12.60 (A)   Hematocrit 37.3 (A) 39.0 (A)   MCV 93.7 92.9   MCH 30.4 30.0   MCHC 32.4 (A) 32.3 (A)   RDW 12.5 12.1   Platelets 185.00 180.00   (A) Abnormal value            CBC w/diff    CBC w/Diff 3/3/21 5/28/21   WBC 5.28 5.12   RBC 3.98 (A) 4.20 (A)   Hemoglobin 12.10 (A) 12.60 (A)   Hematocrit 37.3 (A) 39.0 (A)   MCV 93.7 92.9   MCH 30.4 30.0   MCHC 32.4 (A) 32.3 (A)   RDW 12.5 12.1   Platelets 185.00 180.00   (A) Abnormal value                             Assessment and Plan    Diagnoses and all orders for this visit:    1. Behavioral change (Primary)  Assessment & Plan:  Referred to neurology for further evaluation. Discuss care with Dr. Hutchison who is his PCP. Could consider increasing the Namenda or the citalopram. Also rule out any acute underlying trigger to his recent increase in symptoms. Urinalysis is ordered and results are pending. Denies any fever or any signs of illness.    Orders:  -     Urinalysis With Culture If Indicated -; Future  -     Ambulatory Referral to Neurology    2. Alzheimer's dementia with behavioral disturbance, unspecified timing of dementia onset (HCC)  -     Ambulatory Referral to Neurology      Follow Up    No follow-ups on file.  Patient was given instructions and counseling regarding his condition or for health maintenance advice. Please see specific information pulled into the AVS if appropriate.

## 2022-01-13 DIAGNOSIS — G30.1 LATE ONSET ALZHEIMER'S DEMENTIA WITHOUT BEHAVIORAL DISTURBANCE: ICD-10-CM

## 2022-01-13 DIAGNOSIS — F02.80 LATE ONSET ALZHEIMER'S DEMENTIA WITHOUT BEHAVIORAL DISTURBANCE: ICD-10-CM

## 2022-01-13 RX ORDER — CITALOPRAM 10 MG/1
TABLET ORAL
Qty: 90 TABLET | Refills: 0 | Status: SHIPPED | OUTPATIENT
Start: 2022-01-13 | End: 2022-03-04 | Stop reason: SDUPTHER

## 2022-01-19 DIAGNOSIS — G30.1 LATE ONSET ALZHEIMER'S DEMENTIA WITHOUT BEHAVIORAL DISTURBANCE: ICD-10-CM

## 2022-01-19 DIAGNOSIS — F02.80 LATE ONSET ALZHEIMER'S DEMENTIA WITHOUT BEHAVIORAL DISTURBANCE: ICD-10-CM

## 2022-01-19 RX ORDER — CITALOPRAM 10 MG/1
10 TABLET ORAL DAILY
Qty: 90 TABLET | Refills: 0 | OUTPATIENT
Start: 2022-01-19

## 2022-01-19 NOTE — TELEPHONE ENCOUNTER
Caller: Teofilo Gomez    Relationship: Self    Best call back number: [phone]    Requested Prescriptions:   Requested Prescriptions     Pending Prescriptions Disp Refills   • citalopram (CeleXA) 10 MG tablet 90 tablet 0     Sig: Take 1 tablet by mouth Daily.        Pharmacy where request should be sent: [facility] [address] [place] [address] [phone] [facility] [phone] [facility]     Does the patient have less than a 3 day supply:  [x] Yes  [] No    Ting MORA Rep   01/19/22 11:23 EST

## 2022-03-04 ENCOUNTER — LAB (OUTPATIENT)
Dept: LAB | Facility: HOSPITAL | Age: 82
End: 2022-03-04

## 2022-03-04 ENCOUNTER — HOSPITAL ENCOUNTER (OUTPATIENT)
Dept: GENERAL RADIOLOGY | Facility: HOSPITAL | Age: 82
Discharge: HOME | End: 2022-03-04

## 2022-03-04 ENCOUNTER — OFFICE VISIT (OUTPATIENT)
Dept: FAMILY MEDICINE CLINIC | Age: 82
End: 2022-03-04

## 2022-03-04 VITALS
BODY MASS INDEX: 22.66 KG/M2 | HEART RATE: 61 BPM | DIASTOLIC BLOOD PRESSURE: 57 MMHG | SYSTOLIC BLOOD PRESSURE: 103 MMHG | TEMPERATURE: 97.8 F | HEIGHT: 66 IN | WEIGHT: 141 LBS

## 2022-03-04 DIAGNOSIS — Z79.899 OTHER LONG TERM (CURRENT) DRUG THERAPY: ICD-10-CM

## 2022-03-04 DIAGNOSIS — G89.29 CHRONIC PAIN OF RIGHT KNEE: ICD-10-CM

## 2022-03-04 DIAGNOSIS — M25.561 CHRONIC PAIN OF RIGHT KNEE: ICD-10-CM

## 2022-03-04 DIAGNOSIS — N18.32 CHRONIC KIDNEY DISEASE, STAGE 3B: ICD-10-CM

## 2022-03-04 DIAGNOSIS — F02.818 LATE ONSET ALZHEIMER'S DEMENTIA WITH BEHAVIORAL DISTURBANCE: Primary | ICD-10-CM

## 2022-03-04 DIAGNOSIS — D51.0 VITAMIN B12 DEFICIENCY ANEMIA DUE TO INTRINSIC FACTOR DEFICIENCY: ICD-10-CM

## 2022-03-04 DIAGNOSIS — I10 ESSENTIAL HYPERTENSION: ICD-10-CM

## 2022-03-04 DIAGNOSIS — G30.1 LATE ONSET ALZHEIMER'S DEMENTIA WITH BEHAVIORAL DISTURBANCE: Primary | ICD-10-CM

## 2022-03-04 LAB
ALBUMIN SERPL-MCNC: 4 G/DL (ref 3.5–5.2)
ALBUMIN/GLOB SERPL: 1.6 G/DL
ALP SERPL-CCNC: 72 U/L (ref 39–117)
ALT SERPL W P-5'-P-CCNC: 15 U/L (ref 1–41)
ANION GAP SERPL CALCULATED.3IONS-SCNC: 9.5 MMOL/L (ref 5–15)
AST SERPL-CCNC: 15 U/L (ref 1–40)
BILIRUB SERPL-MCNC: 0.4 MG/DL (ref 0–1.2)
BUN SERPL-MCNC: 20 MG/DL (ref 8–23)
BUN/CREAT SERPL: 10.7 (ref 7–25)
CALCIUM SPEC-SCNC: 9.5 MG/DL (ref 8.6–10.5)
CHLORIDE SERPL-SCNC: 104 MMOL/L (ref 98–107)
CO2 SERPL-SCNC: 26.5 MMOL/L (ref 22–29)
CREAT SERPL-MCNC: 1.87 MG/DL (ref 0.76–1.27)
DEPRECATED RDW RBC AUTO: 45.4 FL (ref 37–54)
EGFRCR SERPLBLD CKD-EPI 2021: 35.5 ML/MIN/1.73
ERYTHROCYTE [DISTWIDTH] IN BLOOD BY AUTOMATED COUNT: 12.6 % (ref 12.3–15.4)
FOLATE SERPL-MCNC: 7.02 NG/ML (ref 4.78–24.2)
GLOBULIN UR ELPH-MCNC: 2.5 GM/DL
GLUCOSE SERPL-MCNC: 85 MG/DL (ref 65–99)
HCT VFR BLD AUTO: 39.2 % (ref 37.5–51)
HGB BLD-MCNC: 12.2 G/DL (ref 13–17.7)
MCH RBC QN AUTO: 30 PG (ref 26.6–33)
MCHC RBC AUTO-ENTMCNC: 31.1 G/DL (ref 31.5–35.7)
MCV RBC AUTO: 96.6 FL (ref 79–97)
PLATELET # BLD AUTO: 221 10*3/MM3 (ref 140–450)
PMV BLD AUTO: 10.4 FL (ref 6–12)
POTASSIUM SERPL-SCNC: 4.5 MMOL/L (ref 3.5–5.2)
PROT SERPL-MCNC: 6.5 G/DL (ref 6–8.5)
RBC # BLD AUTO: 4.06 10*6/MM3 (ref 4.14–5.8)
SODIUM SERPL-SCNC: 140 MMOL/L (ref 136–145)
TSH SERPL DL<=0.05 MIU/L-ACNC: 2.9 UIU/ML (ref 0.27–4.2)
VIT B12 BLD-MCNC: >2000 PG/ML (ref 211–946)
WBC NRBC COR # BLD: 5.5 10*3/MM3 (ref 3.4–10.8)

## 2022-03-04 PROCEDURE — 82607 VITAMIN B-12: CPT

## 2022-03-04 PROCEDURE — 82746 ASSAY OF FOLIC ACID SERUM: CPT

## 2022-03-04 PROCEDURE — 80053 COMPREHEN METABOLIC PANEL: CPT

## 2022-03-04 PROCEDURE — 99214 OFFICE O/P EST MOD 30 MIN: CPT | Performed by: FAMILY MEDICINE

## 2022-03-04 PROCEDURE — 85027 COMPLETE CBC AUTOMATED: CPT

## 2022-03-04 PROCEDURE — 84443 ASSAY THYROID STIM HORMONE: CPT

## 2022-03-04 PROCEDURE — 73562 X-RAY EXAM OF KNEE 3: CPT

## 2022-03-04 PROCEDURE — 36415 COLL VENOUS BLD VENIPUNCTURE: CPT

## 2022-03-04 RX ORDER — LISINOPRIL 5 MG/1
5 TABLET ORAL DAILY
Qty: 90 TABLET | Refills: 3 | Status: SHIPPED | OUTPATIENT
Start: 2022-03-04 | End: 2022-04-06

## 2022-03-04 RX ORDER — CITALOPRAM 20 MG/1
20 TABLET ORAL DAILY
Qty: 90 TABLET | Refills: 1 | Status: SHIPPED | OUTPATIENT
Start: 2022-03-04 | End: 2022-08-02

## 2022-03-04 RX ORDER — CYANOCOBALAMIN (VITAMIN B-12) 250 MCG
250 TABLET ORAL DAILY
Qty: 90 EACH | Refills: 1 | Status: SHIPPED | OUTPATIENT
Start: 2022-03-04

## 2022-03-04 RX ORDER — METOPROLOL SUCCINATE 25 MG/1
25 TABLET, EXTENDED RELEASE ORAL DAILY
Qty: 90 TABLET | Refills: 3 | Status: SHIPPED | OUTPATIENT
Start: 2022-03-04 | End: 2022-09-09 | Stop reason: SDUPTHER

## 2022-03-04 RX ORDER — MEMANTINE HYDROCHLORIDE 10 MG/1
10 TABLET ORAL DAILY
Qty: 90 TABLET | Refills: 1 | Status: SHIPPED | OUTPATIENT
Start: 2022-03-04 | End: 2022-03-16 | Stop reason: SDUPTHER

## 2022-03-04 NOTE — PROGRESS NOTES
"Teofilo Gomez presents to [facility].    Chief Complaint:  Follow up on dementia, blood pressure    Subjective       History of Present Illness:  Francisco is in today for follow-up on dementia.  He has had some changes in his behavior.  His wife says that he is \"forgetting a whole lot\".  She is concerned because he is having some changes in his behavior.  He has become more agitated.  He is asking to go back to [place] for example in a repeated fashion.  He was previously on Aricept, but it was stopped along the way.  We may have had some concern about side effects from it.    Francisco also has hypertension and remains on treatment for this.  His pressure is well controlled today.      Review of Systems:  Review of Systems   Constitutional: Negative for chills and fever.   Respiratory: Negative for cough and shortness of breath.    Cardiovascular: Negative for chest pain and palpitations.   Gastrointestinal: Negative for abdominal pain, nausea and vomiting.        Objective   Medical History:  Past Medical History:   • Abnormal ECG   • Alzheimer disease (HCC)   • Colon polyp   • GERD (gastroesophageal reflux disease)   • Hypertension   • Lipoma    LLQ   • Shoulder pain    Right   • Tendonitis of shoulder     Past Surgical History:   • COLON SURGERY   • COLONOSCOPY   • KNEE SURGERY   • KNEE SURGERY   • SHOULDER SURGERY   • SHOULDER SURGERY   • TONSILLECTOMY      Family History   Problem Relation Age of Onset   • Alzheimer's disease Mother    • Heart disease Mother    • Cancer Father         Unspecified - mouth ca   • Colon cancer Father      Social History     Tobacco Use   • Smoking status: Never Smoker   • Smokeless tobacco: Never Used   Substance Use Topics   • Alcohol use: Never       Health Maintenance Due   Topic Date Due   • COVID-19 Vaccine (1) Never done   • TDAP/TD VACCINES (1 - Tdap) Never done   • ZOSTER VACCINE (1 of 2) Never done   • ANNUAL WELLNESS VISIT  Never done   • " "Pneumococcal Vaccine 65+ (2 of 2 - PPSV23) 08/16/2021        Immunization History   Administered Date(s) Administered   • Pneumococcal Conjugate 13-Valent (PCV13) 08/16/2016   • Pneumococcal Polysaccharide (PPSV23) 11/13/1997   • Tetanus Toxoid, Unspecified 07/31/2007, 07/25/2012       No Known Allergies     Medications:  Current Outpatient Medications on File Prior to Visit   Medication Sig   • aspirin 81 MG EC tablet Take 81 mg by mouth Daily.   • [DISCONTINUED] citalopram (CeleXA) 10 MG tablet Take 1 tablet by mouth once daily   • [DISCONTINUED] cyanocobalamin (VITAMIN B-12) 250 MCG tablet Take 1 tablet by mouth Daily.   • [DISCONTINUED] lisinopril (PRINIVIL,ZESTRIL) 5 MG tablet Take 1 tablet by mouth Daily.   • [DISCONTINUED] memantine (Namenda) 10 MG tablet Take 1 tablet by mouth Daily.   • [DISCONTINUED] metoprolol succinate XL (TOPROL-XL) 25 MG 24 hr tablet Take 1 tablet by mouth Daily.     No current facility-administered medications on file prior to visit.       Vital Signs:   /57 (BP Location: Right arm, Patient Position: Sitting)   Pulse 61   Temp 97.8 °F (36.6 °C) (Oral)   Ht 167.6 cm (65.98\")   Wt 64 kg (141 lb)   BMI 22.77 kg/m²       Physical Exam:  Physical Exam  Vitals reviewed.   Constitutional:       General: He is not in acute distress.     Appearance: He is not ill-appearing.   Eyes:      Pupils: Pupils are equal, round, and reactive to light.   Neck:      Comments: No thyromegaly  Cardiovascular:      Rate and Rhythm: Normal rate and regular rhythm.   Pulmonary:      Effort: Pulmonary effort is normal.      Breath sounds: Normal breath sounds.   Abdominal:      General: There is no distension.      Palpations: Abdomen is soft.      Tenderness: There is no abdominal tenderness.   Musculoskeletal:      Cervical back: Neck supple.   Lymphadenopathy:      Cervical: No cervical adenopathy.   Skin:     Findings: No lesion or rash.   Neurological:      Mental Status: He is alert. He is " disoriented.      Cranial Nerves: No cranial nerve deficit.      Motor: No weakness.         Result Review      The following data was reviewed by Mohinder Hutchison MD on 03/04/2022.  Lab Results   Component Value Date    WBC 5.12 05/28/2021    HGB 12.60 (L) 05/28/2021    HCT 39.0 (L) 05/28/2021    MCV 92.9 05/28/2021    .00 05/28/2021     Lab Results   Component Value Date    GLUCOSE 87 05/28/2021    BUN 13 05/28/2021    CREATININE 1.68 (H) 05/28/2021     05/28/2021    K 4.1 05/28/2021     05/28/2021    CO2 25 05/28/2021    CALCIUM 9.1 05/28/2021    PROTEINTOT 7.1 05/28/2021    ALBUMIN 4.2 05/28/2021    ALT 10 05/28/2021    AST 17 05/28/2021    ALKPHOS 100 05/28/2021    BILITOT 0.60 05/28/2021    GLOB 2.9 05/28/2021    BCR 8 05/28/2021    ANIONGAP 15 05/28/2021      No results found for: CHOL, CHLPL, TRIG, HDL, LDL, LDLDIRECT  No results found for: TSH  No results found for: HGBA1C  Lab Results   Component Value Date    PSA 2.28 05/28/2021    PSA 2.57 07/12/2019             Assessment and Plan:   Today, we have again reviewed his care.  He has struggled recently with some behavior issues.  We will go ahead and make referral to neurology again for second opinion.  I will also review the dosing on his medication and consider whether an increased dose of memantine and/or possibly resuming donepezil would be warranted.  We will see what his blood work shows and then make a decision over the weekend.  No other short-term change.  We discussed that Alzheimer's disease is a progressive illness.  Memory will get worse over time which can create additional challenges.       Diagnoses and all orders for this visit:    1. Late onset Alzheimer's dementia with behavioral disturbance (HCC) (Primary)  -     citalopram (CeleXA) 20 MG tablet; Take 1 tablet by mouth Daily.  Dispense: 90 tablet; Refill: 1  -     memantine (Namenda) 10 MG tablet; Take 1 tablet by mouth Daily.  Dispense: 90 tablet; Refill: 1  -      Ambulatory Referral to Neurology    2. Essential hypertension  -     lisinopril (PRINIVIL,ZESTRIL) 5 MG tablet; Take 1 tablet by mouth Daily.  Dispense: 90 tablet; Refill: 3  -     metoprolol succinate XL (TOPROL-XL) 25 MG 24 hr tablet; Take 1 tablet by mouth Daily.  Dispense: 90 tablet; Refill: 3  -     Comprehensive Metabolic Panel; Future    3. Vitamin B12 deficiency anemia due to intrinsic factor deficiency  -     cyanocobalamin (VITAMIN B-12) 250 MCG tablet; Take 1 tablet by mouth Daily.  Dispense: 90 each; Refill: 1  -     CBC (No Diff); Future  -     Vitamin B12 & Folate; Future    4. Chronic pain of right knee  -     XR Knee 3 View Right; Future    5. Chronic kidney disease, stage 3b (HCC)  -     CBC (No Diff); Future  -     Comprehensive Metabolic Panel; Future    6. Other long term (current) drug therapy  -     TSH; Future          Follow Up   Return in about 6 months (around 9/4/2022) for Recheck, Medicare Wellness.  Patient was given instructions and counseling regarding his condition or for health maintenance advice. Please see specific information pulled into the AVS if appropriate.

## 2022-03-04 NOTE — PATIENT INSTRUCTIONS
Alzheimer's Disease Caregiver Guide  Alzheimer's disease is a brain disease that causes memory loss and changes in behavior. People with Alzheimer's disease often have problems paying attention, communicating, and doing routine tasks. The disease gets worse over time, and people with the disease eventually need full-time care.  Taking care of someone with Alzheimer's disease can be challenging and overwhelming. This guide provides helpful information and tips that can make caring for someone with the disease a little easier.  How to help manage lifestyle changes  Managing symptoms  · Be calm and patient.  · Give short, simple answers to questions. Long answers can overwhelm and confuse the person.  · Avoid correcting the person in a negative way.  · Try not to take things personally, even if the person forgets your name. Understand that changes are a part of the disease process.  · Do not argue or try to convince the person about a specific point. Doing that may make the person feel more agitated.  Reducing frustration  · Make appointments and do daily tasks, like bathing and dressing, when the person is at his or her best.  · Allow for plenty of time for simple tasks because they may take longer than expected. Take your time when doing these tasks.  · Limit the person's choices. Too many choices can be overwhelming and stressful for the person.  · Involve the person in what you are doing.  · Take steps to help keep things organized such as:  ? Keeping a daily routine.  ? Organizing medicines in a pillbox for each day of the week.  ? Keeping a calendar in a central location to remind the person of appointments or other activities.  · Avoid crowds and new situations, if possible.  · Use simple words, short sentences, and a calm voice. Only give one direction at a time.  · Buy clothes and shoes for the person that are easy to put on and take off.  · Try to change the subject or topic if the person becomes frustrated  or angry. Distraction is a great technique for making a situation less tense.  Reducing the risk of injury    · Keep floors clear of clutter. Remove rugs, magazine racks, and floor lamps.  · Keep hallways well-lit, especially at night.  · Put a handrail and nonslip mat in the bathtub or shower.  · Put childproof locks on cabinets that contain dangerous items, such as medicines, alcohol, guns, toxic cleaning items, sharp tools or utensils, matches, and lighters.  · Put locks on doors. Put the locks in places where the person cannot see or reach them easily. This will help ensure that the person does not wander out of the house and get lost.  · Be prepared for emergencies. Keep a list of emergency phone numbers and addresses in a convenient area.  · Remove car keys and lock garage doors so that the person does not try to get in the car and drive.  · A certain type of bracelet may be worn that tracks a person's location and identifies him or her as having memory problems. This should be worn at all times for safety.    Planning for the future    · Discuss financial and legal planning early on in the course of the disease. People with Alzheimer's disease will have trouble managing their money as the disease gets worse. Get help from professional advisers regarding financial and legal matters.  · Discuss advance directives, safety, and daily care. Take these steps:  ? Create a living will and choose a power of . The person with power of  will be able to make decisions for the person with Alzheimer's disease when he or she is no longer able to.  ? Discuss driving safety and when to stop driving. The person's health care provider can help provide assistance with this decision.  ? Discuss the person's living situation. If the person lives alone, make sure he or she is safe. People who live at home may need extra help from home health caregivers, and those who live in a nursing home or care center may need  more care.    How to recognize changes in the person's condition  Alzheimer's disease causes a person to lose the ability to remember things and make decisions. Memory loss and confusion are usually mild at the start of the disease, but they slowly get more severe over time. Eventually, the person may not recognize friends, family members, or familiar places.  Alzheimer's disease can also cause hallucinations, changes in behavior, and changes in mood, such as anxiety or anger. The changes can come on suddenly. They may happen in response to something such as:  · Pain.  · An infection.  · Changes in environment, such as changes in temperature or noise.  · Overstimulation.  · Feeling lost or scared.  · Medicines.  Where to find support  · Ask about respite care resources. Respite care can provide short-term care for the person so that you can have a regular break from the stress of caregiving.  · Consider joining a local support group. Advantages of being part of a support group include:  ? Learning strategies to manage stress.  ? Sharing experiences with others.  ? Receiving emotional comfort and support.  ? Learning about caregiving as the disease progresses.  ? Knowing what community resources are available and making use of them.  Where to find more information  · Alzheimer's Association: www.alz.org  Contact a health care provider if:  · The person has a fever.  · The person has a sudden change in behavior that does not improve with calming strategies.  · The person is unable to manage in his or her current living situation.  · You are no longer able to care for the person.  Get help right away if:  · The person has a sudden increase in confusion or new hallucinations.  · The person threatens himself or herself, you, or anyone else.  If you ever feel like your loved one may hurt himself or herself or others, or shares thoughts about taking his or her own life, get help right away. You can go to your nearest  emergency department or:  · Call your local emergency services (911 in the [place]  · Call a suicide crisis helpline, such as the National Suicide Prevention Lifeline at 1-966.486.2331. This is open 24 hours a day in the [place]  · Text the Crisis Text Line at 058546 (in the [place]  Summary  · Alzheimer's disease is a brain disease that causes memory loss and changes in behavior.  · People with Alzheimer's disease often have problems paying attention, communicating, and doing routine tasks. The disease gets worse over time, and people with the disease eventually need full-time care.  · Take steps to reduce the person's risk of injury and plan for future care.  · Taking care of someone with Alzheimer's disease can be very challenging and overwhelming. One way to find support during this time is to join a local support group.  This information is not intended to replace advice given to you by your health care provider. Make sure you discuss any questions you have with your health care provider.  Document Revised: 04/05/2021 Document Reviewed: 04/05/2021  Elsevier Patient Education © 2021 [employer] Inc.

## 2022-03-05 DIAGNOSIS — N18.32 CHRONIC KIDNEY DISEASE, STAGE 3B: Primary | ICD-10-CM

## 2022-03-05 DIAGNOSIS — F02.818 LATE ONSET ALZHEIMER'S DEMENTIA WITH BEHAVIORAL DISTURBANCE: ICD-10-CM

## 2022-03-05 DIAGNOSIS — G30.1 LATE ONSET ALZHEIMER'S DEMENTIA WITH BEHAVIORAL DISTURBANCE: ICD-10-CM

## 2022-03-05 RX ORDER — DONEPEZIL HYDROCHLORIDE 5 MG/1
5 TABLET, FILM COATED ORAL NIGHTLY
Qty: 90 TABLET | Refills: 1 | Status: SHIPPED | OUTPATIENT
Start: 2022-03-05 | End: 2022-05-18

## 2022-03-07 ENCOUNTER — TELEPHONE (OUTPATIENT)
Dept: FAMILY MEDICINE CLINIC | Age: 82
End: 2022-03-07

## 2022-03-07 NOTE — TELEPHONE ENCOUNTER
Caller: RISHI WALLACE    Relationship to patient: Emergency Contact    Best call back number: [phone]    Patient is needing: PATIENTS WIFE CALLED RETURNING A CALL BACK FOR [facility] FOR LAB RESULTS REGARDING THE PATIENT. THE PATIENTS WIFE WOULD LIKE A CALL BACK PLEASE ADVISE THANK YOU.           HUB STAFF UNABLE TO WARM TRANSFER

## 2022-03-16 ENCOUNTER — TELEPHONE (OUTPATIENT)
Dept: NEUROLOGY | Facility: CLINIC | Age: 82
End: 2022-03-16

## 2022-03-16 ENCOUNTER — OFFICE VISIT (OUTPATIENT)
Dept: NEUROLOGY | Facility: CLINIC | Age: 82
End: 2022-03-16

## 2022-03-16 VITALS
BODY MASS INDEX: 22.98 KG/M2 | HEART RATE: 76 BPM | HEIGHT: 66 IN | DIASTOLIC BLOOD PRESSURE: 60 MMHG | SYSTOLIC BLOOD PRESSURE: 122 MMHG | WEIGHT: 143 LBS

## 2022-03-16 DIAGNOSIS — F02.818 LATE ONSET ALZHEIMER'S DEMENTIA WITH BEHAVIORAL DISTURBANCE: ICD-10-CM

## 2022-03-16 DIAGNOSIS — G30.1 LATE ONSET ALZHEIMER'S DEMENTIA WITH BEHAVIORAL DISTURBANCE: ICD-10-CM

## 2022-03-16 PROCEDURE — 99204 OFFICE O/P NEW MOD 45 MIN: CPT | Performed by: PSYCHIATRY & NEUROLOGY

## 2022-03-16 RX ORDER — MEMANTINE HYDROCHLORIDE 10 MG/1
TABLET ORAL
Qty: 120 TABLET | Refills: 5 | Status: SHIPPED | OUTPATIENT
Start: 2022-03-16 | End: 2022-03-16 | Stop reason: DRUGHIGH

## 2022-03-16 RX ORDER — MEMANTINE HYDROCHLORIDE 5 MG/1
TABLET ORAL
Qty: 60 TABLET | Refills: 4 | Status: SHIPPED | OUTPATIENT
Start: 2022-03-16 | End: 2022-05-18

## 2022-03-16 NOTE — ASSESSMENT & PLAN NOTE
I discussed with the wife that he has moderate Alzheimer's dementia/vascular dementia as noted on the CT scan with significant small vessel disease.  I will do a CT scan of the brain since he has fallen down many times and I will make sure that there is no subdural hematoma.  I discussed with her that there is no other medications to give for him that treatment is supportive with her taking care of him.  I will do change to memantine 5 mg twice a day.  He has chronic any disease.  I will prescribe him a Rollator walker and I discussed with him and his wife that he needs to use this at all times since he is unsteady.  The unsteadiness is most likely secondary to small vessel disease chronic hypertension.  They are to call our office to find out the CT scan of the brain results.

## 2022-03-16 NOTE — PROGRESS NOTES
"Chief Complaint  Memory Loss    Subjective          Teofilo Gomez is a 81 y.o. male who presents to [facility]  History of Present Illness  81-year-old man evaluated for dementia.  According to the wife I have seen Mr. Gomez before and diagnosed him to have Alzheimer's dementia.  CT scan of brain shows significant small vessel disease.  He has never had a stroke according to the wife.  He requires assistance for activities of daily living.  She has to remind him to bathe and to dress himself.  This has been ongoing for the last 5 years and has gotten worse in the last 2 years.  The patient does not drive.  He sits around in the house most of the time.  He has fallen on several times a month.  Is present taking memantine 10 mg daily and Donepezil 5 mg daily.  He has had some behavioral changes according to the chart however the wife does not relate this to me.  She tells me that she has to assist him with bathing since he does not turn on the shower the right way or soap himself correctly.    Objective   Vital Signs:   /60   Pulse 76   Ht 167.6 cm (65.98\")   Wt 64.9 kg (143 lb)   BMI 23.09 kg/m²     Physical Exam   Is alert, fluent, phasic, follows commands well.  His MoCA score is 8 out of 30.  Visuospatial, executive 1 out of 5, naming 2 out of 3, attention 1 out of 2, 0 out of 1, 0 out of 3, language 2 out of 2, 0 out of 1, abstraction 1 out of 1, delayed recall 0 out of 5, orientation 1 out of 6.  He cannot tell me how long he has been .  He cannot tell me his wife's birthday or his son's birthday.  He cannot tell me how many times he has been  before.  He has been with his present wife for over 20 years.  Did not have any children together.  He cannot tell me the president [place].  The last president he can recall was [place].  He cannot recall any more presidents other than [place].  I asked him to name the any president in the last 250 " years and he could not tell me any other president.  He makes excuses as well that he is not in politics.  He cannot tell me his son's birthday.  He can tell me his name is Aleksandr.  He cannot tell me how old he is.  Visual fields full confrontation, EOMs full all directions gaze, facial strength is full, soft palate elevation and tongue are normal.  There is no weakness of the upper or lower extremities.  Reflexes are decreased in the biceps, triceps, patellar's and ankles not tested.  Station gait is able to ambulate without difficulty.  Armswing is intact.  Turning is intact.  He is able to tiptoe.  He has slight difficulty with tandem.  Heart is regular rhythm normal rate        Assessment and Plan  Diagnoses and all orders for this visit:    1. Late onset Alzheimer's dementia with behavioral disturbance (HCC)  Assessment & Plan:  I discussed with the wife that he has moderate Alzheimer's dementia/vascular dementia as noted on the CT scan with significant small vessel disease.  I will do a CT scan of the brain since he has fallen down many times and I will make sure that there is no subdural hematoma.  I discussed with her that there is no other medications to give for him that treatment is supportive with her taking care of him.  I will do change to memantine 5 mg twice a day.  He has chronic any disease.  I will prescribe him a Rollator walker and I discussed with him and his wife that he needs to use this at all times since he is unsteady.  The unsteadiness is most likely secondary to small vessel disease chronic hypertension.  They are to call our office to find out the CT scan of the brain results.    Orders:  -     Discontinue: memantine (Namenda) 10 MG tablet; Take 1 tablet twice a day  Dispense: 120 tablet; Refill: 5  -     CT Head Without Contrast; Future    Other orders  -     memantine (NAMENDA) 5 MG tablet; 1 tablet twice a day  Dispense: 60 tablet; Refill: 4       Total time spent with the patient  and coordinating patient care was 45 minutes.    Follow Up  No follow-ups on file.  Patient was given instructions and counseling regarding his condition or for health maintenance advice. Please see specific information pulled into the AVS if appropriate.

## 2022-03-16 NOTE — TELEPHONE ENCOUNTER
Hub is correct in that the pt is to take 5mg dose per office note. I called and notified the pharmacy and they voiced understanding.

## 2022-03-16 NOTE — TELEPHONE ENCOUNTER
Caller: [facility] [address] [place] [address] [address] [facility] [phone] [facility] [phone]     Relationship: Pharmacy    Best call back number: SEE ABOVE    What medications are you currently taking:   Current Outpatient Medications on File Prior to Visit   Medication Sig Dispense Refill   • aspirin 81 MG EC tablet Take 81 mg by mouth Daily.     • citalopram (CeleXA) 20 MG tablet Take 1 tablet by mouth Daily. 90 tablet 1   • cyanocobalamin (VITAMIN B-12) 250 MCG tablet Take 1 tablet by mouth Daily. 90 each 1   • donepezil (ARICEPT) 5 MG tablet Take 1 tablet by mouth Every Night. 90 tablet 1   • lisinopril (PRINIVIL,ZESTRIL) 5 MG tablet Take 1 tablet by mouth Daily. 90 tablet 3   • memantine (NAMENDA) 5 MG tablet 1 tablet twice a day 60 tablet 4   • metoprolol succinate XL (TOPROL-XL) 25 MG 24 hr tablet Take 1 tablet by mouth Daily. 90 tablet 3   • [DISCONTINUED] memantine (Namenda) 10 MG tablet Take 1 tablet by mouth Daily. 90 tablet 1   • [DISCONTINUED] memantine (Namenda) 10 MG tablet Take 1 tablet twice a day 120 tablet 5     No current facility-administered medications on file prior to visit.          When did you start taking these medications: NA    Which medication are you concerned about: MEMANTINE    Who prescribed you this medication:     What are your concerns: LAVON WITH [facility] WANTED TO CLARIFY MEMANTINE RX  AS THEY RECEIVED 5MG AND 10MG. I LET HER KNOW PER THE CHART  DID A DOSE ADJUSTMENT AND IT IS FOR 5MG. PLEASE ADVISE.     How long have you had these concerns: NA

## 2022-03-24 ENCOUNTER — HOSPITAL ENCOUNTER (OUTPATIENT)
Dept: CT IMAGING | Facility: HOSPITAL | Age: 82
Discharge: HOME | End: 2022-03-24
Admitting: PSYCHIATRY & NEUROLOGY

## 2022-03-24 DIAGNOSIS — G30.1 LATE ONSET ALZHEIMER'S DEMENTIA WITH BEHAVIORAL DISTURBANCE: ICD-10-CM

## 2022-03-24 DIAGNOSIS — F02.818 LATE ONSET ALZHEIMER'S DEMENTIA WITH BEHAVIORAL DISTURBANCE: ICD-10-CM

## 2022-03-24 PROCEDURE — 70450 CT HEAD/BRAIN W/O DYE: CPT

## 2022-03-28 ENCOUNTER — TELEPHONE (OUTPATIENT)
Dept: FAMILY MEDICINE CLINIC | Age: 82
End: 2022-03-28

## 2022-03-31 ENCOUNTER — LAB (OUTPATIENT)
Dept: LAB | Facility: HOSPITAL | Age: 82
End: 2022-03-31

## 2022-03-31 DIAGNOSIS — N18.32 CHRONIC KIDNEY DISEASE, STAGE 3B: ICD-10-CM

## 2022-03-31 LAB
BACTERIA UR QL AUTO: ABNORMAL /HPF
BILIRUB UR QL STRIP: NEGATIVE
CLARITY UR: ABNORMAL
COLOR UR: YELLOW
GLUCOSE UR STRIP-MCNC: NEGATIVE MG/DL
HGB UR QL STRIP.AUTO: NEGATIVE
HYALINE CASTS UR QL AUTO: ABNORMAL /LPF
KETONES UR QL STRIP: NEGATIVE
LEUKOCYTE ESTERASE UR QL STRIP.AUTO: NEGATIVE
MUCOUS THREADS URNS QL MICRO: ABNORMAL /HPF
NITRITE UR QL STRIP: NEGATIVE
PH UR STRIP.AUTO: 5.5 [PH] (ref 5–8)
PROT UR QL STRIP: NEGATIVE
RBC # UR STRIP: ABNORMAL /HPF
REF LAB TEST METHOD: ABNORMAL
SP GR UR STRIP: >=1.03 (ref 1–1.03)
SQUAMOUS #/AREA URNS HPF: ABNORMAL /HPF
UROBILINOGEN UR QL STRIP: ABNORMAL
WBC # UR STRIP: ABNORMAL /HPF

## 2022-03-31 PROCEDURE — 36415 COLL VENOUS BLD VENIPUNCTURE: CPT

## 2022-03-31 PROCEDURE — 80048 BASIC METABOLIC PNL TOTAL CA: CPT

## 2022-03-31 PROCEDURE — 81001 URINALYSIS AUTO W/SCOPE: CPT

## 2022-04-01 ENCOUNTER — TELEPHONE (OUTPATIENT)
Dept: FAMILY MEDICINE CLINIC | Age: 82
End: 2022-04-01

## 2022-04-01 ENCOUNTER — TELEPHONE (OUTPATIENT)
Dept: NEUROLOGY | Facility: CLINIC | Age: 82
End: 2022-04-01

## 2022-04-01 LAB
ANION GAP SERPL CALCULATED.3IONS-SCNC: 10 MMOL/L (ref 5–15)
BUN SERPL-MCNC: 20 MG/DL (ref 8–23)
BUN/CREAT SERPL: 9.5 (ref 7–25)
CALCIUM SPEC-SCNC: 8.4 MG/DL (ref 8.6–10.5)
CHLORIDE SERPL-SCNC: 103 MMOL/L (ref 98–107)
CO2 SERPL-SCNC: 24 MMOL/L (ref 22–29)
CREAT SERPL-MCNC: 2.11 MG/DL (ref 0.76–1.27)
EGFRCR SERPLBLD CKD-EPI 2021: 30.7 ML/MIN/1.73
GLUCOSE SERPL-MCNC: 82 MG/DL (ref 65–99)
POTASSIUM SERPL-SCNC: 4.7 MMOL/L (ref 3.5–5.2)
SODIUM SERPL-SCNC: 137 MMOL/L (ref 136–145)

## 2022-04-01 NOTE — TELEPHONE ENCOUNTER
Caller: RISHI PT GAVE VERBAL TO SPK TO HER .     Relationship: WIFE     Best call back number: [phone]    Caller requesting test results: RISHI    What test was performed: CT     When was the test performed: 03.24.22    Where was the test performed: [facility]     Additional notes: WOULD LIKE TO GET THE TEST RESULT FROM CT     PLEASE CALL AND ADVISE.

## 2022-04-01 NOTE — TELEPHONE ENCOUNTER
Caller: RISHI WALLACE    Relationship: Emergency Contact    Best call back number: [phone]    Who are you requesting to speak with (clinical staff, provider,  specific staff member): CLINICAL    What was the call regarding: PATIENTS WIFE STATES SHE WOULD LIKE A CALL BACK TO GET THE RESULTS OF THE PATIENTS CT SCAN.    Do you require a callback: YES

## 2022-04-02 DIAGNOSIS — N18.32 CHRONIC KIDNEY DISEASE, STAGE 3B: Primary | ICD-10-CM

## 2022-04-04 NOTE — TELEPHONE ENCOUNTER
PATIENT'S WIFE CALLING BACK TO CHECK ON THE STATUS OF MRI RESULTS    PLEASE CALL HER BACK     THANK YOU

## 2022-04-04 NOTE — TELEPHONE ENCOUNTER
"I called and discussed this with the pt and she voiced understanding. She also advised me that the pt had a spell \"around 3-4pm yesterday\" where he had LOC and his \"tongue was handing out while he was sitting in the recliner\" and was unresponsive. EMS was called and he was flown to  for suspected stroke. However, she said that they told her that it was a seizure and not a stroke and sent him home where he is now. She said that since then, the patient is \"not acting like himself\" and is very weak. She denies him having slurring or difficulty with speech. Can you please get records from  for this visit? I am unable to retrieve them on CareEverywhere.   "

## 2022-04-05 ENCOUNTER — TELEPHONE (OUTPATIENT)
Dept: FAMILY MEDICINE CLINIC | Age: 82
End: 2022-04-05

## 2022-04-05 NOTE — TELEPHONE ENCOUNTER
I do think it is reasonable to use the medication.  However, confirm what dose he was given when he was released from the emergency department.  Also, it would be helpful to have a copy of the ER record and any labs or imaging that were done.  Thanks.

## 2022-04-05 NOTE — TELEPHONE ENCOUNTER
Pts wife states pt was seen over the weekend in ER and dx with seizure, was started on Keppra. She states it says to discuss with pcp prior to starting if pt has kidney issues. She would like to know if pt should take this med. Please advise.

## 2022-04-06 ENCOUNTER — TELEPHONE (OUTPATIENT)
Dept: FAMILY MEDICINE CLINIC | Age: 82
End: 2022-04-06

## 2022-04-06 DIAGNOSIS — I10 ESSENTIAL HYPERTENSION: Primary | ICD-10-CM

## 2022-04-06 RX ORDER — AMLODIPINE BESYLATE 2.5 MG/1
2.5 TABLET ORAL NIGHTLY
Qty: 30 TABLET | Refills: 1 | Status: SHIPPED | OUTPATIENT
Start: 2022-04-06 | End: 2022-09-09

## 2022-04-06 NOTE — TELEPHONE ENCOUNTER
pts wife states pts bp this am is 160/93 and pulse is 55. She would like to know if pt needs to go back on his blood pressure med that you stopped. Please advise.

## 2022-04-06 NOTE — TELEPHONE ENCOUNTER
No.  However, I have sent a low-dose of amlodipine to add to his medications.  I would recommend they dose this at nighttime given some risk of swelling in the lower legs and feet with it.  Again, this is a low-dose of this particular medication.  I would recommend she update us again in 1 to 2 weeks and let us know how his blood pressure is running.  We might consider increasing the dose.  He should keep the scheduled follow-up appointment with nephrology.  Thanks.

## 2022-04-19 ENCOUNTER — TRANSCRIBE ORDERS (OUTPATIENT)
Dept: ADMINISTRATIVE | Facility: HOSPITAL | Age: 82
End: 2022-04-19

## 2022-04-19 DIAGNOSIS — N18.30 STAGE 3 CHRONIC KIDNEY DISEASE, UNSPECIFIED WHETHER STAGE 3A OR 3B CKD: Primary | ICD-10-CM

## 2022-04-21 NOTE — TELEPHONE ENCOUNTER
Javier left detailed message with [facility] asking why we have not received records yet & asking to send them as no one was avail to take our call after being on hold for over 30 min.

## 2022-04-25 ENCOUNTER — HOSPITAL ENCOUNTER (OUTPATIENT)
Dept: ULTRASOUND IMAGING | Facility: HOSPITAL | Age: 82
Discharge: HOME | End: 2022-04-25

## 2022-04-25 ENCOUNTER — LAB (OUTPATIENT)
Dept: LAB | Facility: HOSPITAL | Age: 82
End: 2022-04-25

## 2022-04-25 ENCOUNTER — TRANSCRIBE ORDERS (OUTPATIENT)
Dept: ADMINISTRATIVE | Facility: HOSPITAL | Age: 82
End: 2022-04-25

## 2022-04-25 DIAGNOSIS — N18.30 STAGE 3 CHRONIC KIDNEY DISEASE, UNSPECIFIED WHETHER STAGE 3A OR 3B CKD: ICD-10-CM

## 2022-04-25 DIAGNOSIS — N18.30 STAGE 3 CHRONIC KIDNEY DISEASE, UNSPECIFIED WHETHER STAGE 3A OR 3B CKD: Primary | ICD-10-CM

## 2022-04-25 DIAGNOSIS — I10 HYPERTENSION, ESSENTIAL: ICD-10-CM

## 2022-04-25 LAB
ALBUMIN UR-MCNC: <1.2 MG/DL
CREAT UR-MCNC: 48.8 MG/DL
MICROALBUMIN/CREAT UR: NORMAL MG/G{CREAT}

## 2022-04-25 PROCEDURE — 83540 ASSAY OF IRON: CPT

## 2022-04-25 PROCEDURE — 36415 COLL VENOUS BLD VENIPUNCTURE: CPT

## 2022-04-25 PROCEDURE — 84466 ASSAY OF TRANSFERRIN: CPT

## 2022-04-25 PROCEDURE — 82043 UR ALBUMIN QUANTITATIVE: CPT

## 2022-04-25 PROCEDURE — 76775 US EXAM ABDO BACK WALL LIM: CPT

## 2022-04-25 PROCEDURE — 80053 COMPREHEN METABOLIC PANEL: CPT

## 2022-04-25 PROCEDURE — 82570 ASSAY OF URINE CREATININE: CPT

## 2022-04-26 LAB
ALBUMIN SERPL-MCNC: 3.9 G/DL (ref 3.5–5.2)
ALBUMIN/GLOB SERPL: 1.9 G/DL
ALP SERPL-CCNC: 70 U/L (ref 39–117)
ALT SERPL W P-5'-P-CCNC: 7 U/L (ref 1–41)
ANION GAP SERPL CALCULATED.3IONS-SCNC: 11.1 MMOL/L (ref 5–15)
AST SERPL-CCNC: 12 U/L (ref 1–40)
BILIRUB SERPL-MCNC: 0.2 MG/DL (ref 0–1.2)
BUN SERPL-MCNC: 23 MG/DL (ref 8–23)
BUN/CREAT SERPL: 11.6 (ref 7–25)
CALCIUM SPEC-SCNC: 8.5 MG/DL (ref 8.6–10.5)
CHLORIDE SERPL-SCNC: 103 MMOL/L (ref 98–107)
CO2 SERPL-SCNC: 24.9 MMOL/L (ref 22–29)
CREAT SERPL-MCNC: 1.98 MG/DL (ref 0.76–1.27)
EGFRCR SERPLBLD CKD-EPI 2021: 33.1 ML/MIN/1.73
GLOBULIN UR ELPH-MCNC: 2.1 GM/DL
GLUCOSE SERPL-MCNC: 90 MG/DL (ref 65–99)
IRON 24H UR-MRATE: 60 MCG/DL (ref 59–158)
IRON SATN MFR SERPL: 21 % (ref 20–50)
POTASSIUM SERPL-SCNC: 4.5 MMOL/L (ref 3.5–5.2)
PROT SERPL-MCNC: 6 G/DL (ref 6–8.5)
SODIUM SERPL-SCNC: 139 MMOL/L (ref 136–145)
TIBC SERPL-MCNC: 291 MCG/DL (ref 298–536)
TRANSFERRIN SERPL-MCNC: 195 MG/DL (ref 200–360)

## 2022-04-29 ENCOUNTER — NURSE TRIAGE (OUTPATIENT)
Dept: CALL CENTER | Facility: HOSPITAL | Age: 82
End: 2022-04-29

## 2022-04-29 ENCOUNTER — TELEPHONE (OUTPATIENT)
Dept: FAMILY MEDICINE CLINIC | Age: 82
End: 2022-04-29

## 2022-04-29 NOTE — TELEPHONE ENCOUNTER
Patient  Has been falling a lot, according to spouse has not hit head. Complains of being dizzy and has a headache. Has Dementia. Going to the ED    Reason for Disposition  • Headache  (and neurologic deficit)    Additional Information  • Negative: [1] SEVERE weakness (i.e., unable to walk or barely able to walk, requires support) AND [2] new-onset or worsening  • Negative: [1] Weakness (i.e., paralysis, loss of muscle strength) of the face, arm / hand, or leg / foot on one side of the body AND [2] sudden onset AND [3] present now (Exception: Bell's palsy suspected [i.e., weakness only one side of the face, developing over hours to days, no other symptoms])  • Negative: [1] Numbness (i.e., loss of sensation) of the face, arm / hand, or leg / foot on one side of the body AND [2] sudden onset AND [3] present now  • Negative: [1] Loss of speech or garbled speech AND [2] sudden onset AND [3] present now  • Negative: Difficult to awaken or acting confused (e.g., disoriented, slurred speech)  • Negative: Sounds like a life-threatening emergency to the triager  • Negative: Confusion, disorientation, or hallucinations is main symptom  • Negative: Neck pain is main symptom (and having weakness, numbness, or tingling in arm / hand because of neck pain)  • Negative: Back pain is main symptom (and having weakness, numbness, or tingling in leg because of back pain)  • Negative: Hand pain is main symptom (and having mild weakness, numbness, or tingling in hand related to hand pain)  • Negative: Dizziness is main symptom  • Negative: Vision loss or change is main symptom  • Negative: Followed a head injury within last 3 days  • Negative: Followed a neck injury within last 3 days  • Negative: [1] Tingling in both hands and/or feet AND [2] breathing faster than normal AND [3] feels similar to prior panic attack or hyperventilation episode  • Negative: Weakness in both sides of the body or weakness all over    Answer Assessment -  "Initial Assessment Questions  1. SYMPTOM: \"What is the main symptom you are concerned about?\" (e.g., weakness, numbness)      Dizziness , has been falling and headacye  2. ONSET: \"When did this start?\" (minutes, hours, days; while sleeping)      2 weeks  3. LAST NORMAL: \"When was the last time you were normal (no symptoms)?\"      Has dementia  4. PATTERN \"Does this come and go, or has it been constant since it started?\"  \"Is it present now?\"      constang  5. CARDIAC SYMPTOMS: \"Have you had any of the following symptoms: chest pain, difficulty breathing, palpitations?\"      na  6. NEUROLOGIC SYMPTOMS: \"Have you had any of the following symptoms: headache, dizziness, vision loss, double vision, changes in speech, unsteady on your feet?\"      Has been falling a lot, dizziness and has a headache  7. OTHER SYMPTOMS: \"Do you have any other symptoms?\"      dementia  8. PREGNANCY: \"Is there any chance you are pregnant?\" \"When was your last menstrual period?\"      na    Protocols used: NEUROLOGIC DEFICIT-ADULT-AH      "

## 2022-04-29 NOTE — TELEPHONE ENCOUNTER
"    Caller: BRIGETTE    Relationship to patient: SON    Best call back number: [phone]    Patient is needing: PATIENT'S SON CALLED IN AND SAID THAT PATIENT NEEDS AN ULTRASOUND OF HIS NECK. HE SAID THAT PATIENT IS ACTING LIKE \" HIS MOTHER DID\" AND NEEDED AN ULTRASOUND ON HIS NECK. SON IS NOT ON [facility] [employer], BUT STEP MOTHER IS. SON REQUESTS A CALL BACK TO HIS STEP MOTHER OR HIMSELF.        "

## 2022-05-18 ENCOUNTER — OFFICE VISIT (OUTPATIENT)
Dept: NEUROLOGY | Facility: CLINIC | Age: 82
End: 2022-05-18

## 2022-05-18 VITALS
HEART RATE: 50 BPM | WEIGHT: 140 LBS | DIASTOLIC BLOOD PRESSURE: 82 MMHG | HEIGHT: 66 IN | BODY MASS INDEX: 22.5 KG/M2 | SYSTOLIC BLOOD PRESSURE: 148 MMHG

## 2022-05-18 DIAGNOSIS — G30.1 LATE ONSET ALZHEIMER'S DEMENTIA WITH BEHAVIORAL DISTURBANCE: ICD-10-CM

## 2022-05-18 DIAGNOSIS — F02.818 LATE ONSET ALZHEIMER'S DEMENTIA WITH BEHAVIORAL DISTURBANCE: ICD-10-CM

## 2022-05-18 DIAGNOSIS — R55 SYNCOPE AND COLLAPSE: Primary | ICD-10-CM

## 2022-05-18 PROCEDURE — 99215 OFFICE O/P EST HI 40 MIN: CPT | Performed by: PSYCHIATRY & NEUROLOGY

## 2022-05-18 NOTE — ASSESSMENT & PLAN NOTE
I discussed with him that his history is not consistent with a seizure and therefore he will not be started on antiepileptic medication.  I discussed with him that most likely he did have syncope and it is secondary to a cardiac etiology.  He has an appointment to see cardiology on June 21.  In the meantime I discussed with him that he needs to follow-up with his primary care provider Dr. Mohinder Hutchison in [place].  I discussed with him that he is antihypertensive medication needs to be adjusted.  He is present taking Norvasc and metoprolol.    I have not made a follow-up visit to see him.

## 2022-05-18 NOTE — PROGRESS NOTES
"Chief Complaint  Neurologic Problem (Altered conscious)    Subjective          Teofilo Gomez is a 81 y.o. male who presents to [facility]  History of Present Illness  81-year-old man here for follow-up of an event that occurred May 3 of this month.  According to his stepson the patient was sitting down in a chair and all of a sudden slumped down.  He was limp like a dish rag.  He was vibrating in his mouth and his tongue was hanging out.  He was not responsive.  It took 30 minutes for EMS to respond.  When EMS came they ask him if his right arm was weak and numb and he responded yes and therefore they called an air ambulance to pick him up.  He was flown to [facility] where he had a stroke work-up which was negative and was discharged after 4 hours.  He is on aspirin.  They gave him a diagnosis of seizure and was started on Keppra which his wife gave him for 3 days and he became agitated and she discontinued it.  He never had any stiffening or jerking.    He had an appointment to see cardiology and it was rescheduled.    The patient has a diagnosis of Alzheimer's disease and I have seen him for several years.  Apparently he is taking Donepezil or memantine at this time according to his pharmacy.  He requires assistance with all activities of daily living.  He is able to ambulate without difficulty initially but then he starts getting weak and starts getting wobbly.  They have to help him when he is unsteady.  He refuses to use his walker.  In the past this was a problem in which I did prescribe him a Rollator walker secondary to his unsteadiness.  I discussed with him that the status was most likely secondary to chronic small vessel disease from hypertension.    Objective   Vital Signs:   /82   Pulse 50   Ht 167.6 cm (66\")   Wt 63.5 kg (140 lb)   BMI 22.60 kg/m²     Physical Exam   Is alert, fluent, aphasic, follows commands.  He does not recognize me or " remember me.  EOMs full all directions gaze, facial strength is full, soft palate elevation and tongue are normal.  There is no pronator drift.  Fine finger movements are intact.  There is no weakness of the upper or lower extremities.  Station gait is able to ambulate without difficulty independently.  Armswing is normal.  Heart is bradycardic and regular in rhythm.        Assessment and Plan  Diagnoses and all orders for this visit:    1. Syncope and collapse (Primary)  Assessment & Plan:  I discussed with him that his history is not consistent with a seizure and therefore he will not be started on antiepileptic medication.  I discussed with him that most likely he did have syncope and it is secondary to a cardiac etiology.  He has an appointment to see cardiology on June 21.  In the meantime I discussed with him that he needs to follow-up with his primary care provider Dr. Mohinder Hutchison in [place].  I discussed with him that he is antihypertensive medication needs to be adjusted.  He is present taking Norvasc and metoprolol.    I have not made a follow-up visit to see him.      2. 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  Donepezil will be discontinued since he has bradycardia.  I do not think that memantine will add any benefit to his present condition.  He needs assistance for all activities of daily living.         Total time spent with the patient and coordinating patient care was 45 minutes.    Follow Up  No follow-ups on file.  Patient was given instructions and counseling regarding his condition or for health maintenance advice. Please see specific information pulled into the AVS if appropriate.

## 2022-05-18 NOTE — ASSESSMENT & PLAN NOTE
Discussed with the wife and the son that I will discontinue memantine and Donepezil.  Donepezil will be discontinued since he has bradycardia.  I do not think that memantine will add any benefit to his present condition.  He needs assistance for all activities of daily living.

## 2022-07-22 ENCOUNTER — OFFICE VISIT (OUTPATIENT)
Dept: FAMILY MEDICINE CLINIC | Age: 82
End: 2022-07-22

## 2022-07-22 ENCOUNTER — LAB (OUTPATIENT)
Dept: LAB | Facility: HOSPITAL | Age: 82
End: 2022-07-22

## 2022-07-22 VITALS
BODY MASS INDEX: 21.69 KG/M2 | OXYGEN SATURATION: 98 % | DIASTOLIC BLOOD PRESSURE: 70 MMHG | SYSTOLIC BLOOD PRESSURE: 119 MMHG | HEART RATE: 58 BPM | HEIGHT: 66 IN | TEMPERATURE: 97.6 F | WEIGHT: 135 LBS

## 2022-07-22 DIAGNOSIS — R19.7 DIARRHEA, UNSPECIFIED TYPE: ICD-10-CM

## 2022-07-22 DIAGNOSIS — R42 DIZZINESS: ICD-10-CM

## 2022-07-22 DIAGNOSIS — R19.7 DIARRHEA, UNSPECIFIED TYPE: Primary | ICD-10-CM

## 2022-07-22 LAB
ALBUMIN SERPL-MCNC: 3.8 G/DL (ref 3.5–5.2)
ALBUMIN/GLOB SERPL: 1.4 G/DL
ALP SERPL-CCNC: 75 U/L (ref 39–117)
ALT SERPL W P-5'-P-CCNC: 8 U/L (ref 1–41)
ANION GAP SERPL CALCULATED.3IONS-SCNC: 8 MMOL/L (ref 5–15)
AST SERPL-CCNC: 13 U/L (ref 1–40)
BASOPHILS # BLD AUTO: 0.05 10*3/MM3 (ref 0–0.2)
BASOPHILS NFR BLD AUTO: 0.8 % (ref 0–1.5)
BILIRUB SERPL-MCNC: 0.4 MG/DL (ref 0–1.2)
BUN SERPL-MCNC: 16 MG/DL (ref 8–23)
BUN/CREAT SERPL: 9.2 (ref 7–25)
CALCIUM SPEC-SCNC: 8.8 MG/DL (ref 8.6–10.5)
CHLORIDE SERPL-SCNC: 102 MMOL/L (ref 98–107)
CO2 SERPL-SCNC: 27 MMOL/L (ref 22–29)
CREAT SERPL-MCNC: 1.73 MG/DL (ref 0.76–1.27)
DEPRECATED RDW RBC AUTO: 45.6 FL (ref 37–54)
EGFRCR SERPLBLD CKD-EPI 2021: 38.9 ML/MIN/1.73
EOSINOPHIL # BLD AUTO: 0.14 10*3/MM3 (ref 0–0.4)
EOSINOPHIL NFR BLD AUTO: 2.4 % (ref 0.3–6.2)
ERYTHROCYTE [DISTWIDTH] IN BLOOD BY AUTOMATED COUNT: 12.5 % (ref 12.3–15.4)
GLOBULIN UR ELPH-MCNC: 2.8 GM/DL
GLUCOSE SERPL-MCNC: 85 MG/DL (ref 65–99)
HCT VFR BLD AUTO: 37.3 % (ref 37.5–51)
HGB BLD-MCNC: 11.3 G/DL (ref 13–17.7)
IMM GRANULOCYTES # BLD AUTO: 0.01 10*3/MM3 (ref 0–0.05)
IMM GRANULOCYTES NFR BLD AUTO: 0.2 % (ref 0–0.5)
LYMPHOCYTES # BLD AUTO: 1.67 10*3/MM3 (ref 0.7–3.1)
LYMPHOCYTES NFR BLD AUTO: 28.2 % (ref 19.6–45.3)
MCH RBC QN AUTO: 29.7 PG (ref 26.6–33)
MCHC RBC AUTO-ENTMCNC: 30.3 G/DL (ref 31.5–35.7)
MCV RBC AUTO: 97.9 FL (ref 79–97)
MONOCYTES # BLD AUTO: 0.43 10*3/MM3 (ref 0.1–0.9)
MONOCYTES NFR BLD AUTO: 7.3 % (ref 5–12)
NEUTROPHILS NFR BLD AUTO: 3.63 10*3/MM3 (ref 1.7–7)
NEUTROPHILS NFR BLD AUTO: 61.1 % (ref 42.7–76)
PLATELET # BLD AUTO: 189 10*3/MM3 (ref 140–450)
PMV BLD AUTO: 10.4 FL (ref 6–12)
POTASSIUM SERPL-SCNC: 3.8 MMOL/L (ref 3.5–5.2)
PROT SERPL-MCNC: 6.6 G/DL (ref 6–8.5)
RBC # BLD AUTO: 3.81 10*6/MM3 (ref 4.14–5.8)
SODIUM SERPL-SCNC: 137 MMOL/L (ref 136–145)
WBC NRBC COR # BLD: 5.93 10*3/MM3 (ref 3.4–10.8)

## 2022-07-22 PROCEDURE — 36415 COLL VENOUS BLD VENIPUNCTURE: CPT

## 2022-07-22 PROCEDURE — 99214 OFFICE O/P EST MOD 30 MIN: CPT | Performed by: PHYSICIAN ASSISTANT

## 2022-07-22 PROCEDURE — 85025 COMPLETE CBC W/AUTO DIFF WBC: CPT

## 2022-07-22 PROCEDURE — 80053 COMPREHEN METABOLIC PANEL: CPT

## 2022-07-22 RX ORDER — MEMANTINE HYDROCHLORIDE 10 MG/1
10 TABLET ORAL 2 TIMES DAILY
COMMUNITY
End: 2022-09-09 | Stop reason: SDUPTHER

## 2022-07-22 NOTE — PROGRESS NOTES
Subjective     CHIEF COMPLAINT    Chief Complaint   Patient presents with   • Diarrhea     Ongoing for a week.    • Dizziness     Pt's wife states he's fallen atleast 20 times. Ongoing for a couple weeks.    • Headache     Pt declines covid and flu test.             Patient's wife is providing much of the history given his history of dementia.     Diarrhea   This is a new problem. The current episode started in the past 7 days. The problem occurs more than 10 times per day. The problem has been gradually worsening. Diarrhea characteristics: wife has not seen. The patient states that diarrhea does not awaken him from sleep. Associated symptoms include headaches. Pertinent negatives include no abdominal pain, chills, coughing, fever or vomiting. There are no known risk factors. He has tried nothing for the symptoms.   Dizziness  This is a new problem. The current episode started 1 to 4 weeks ago. The problem occurs constantly. The problem has been gradually worsening. Associated symptoms include a change in bowel habit and headaches. Pertinent negatives include no abdominal pain, chills, congestion, coughing, fever, nausea, numbness or vomiting. The symptoms are aggravated by standing and walking. He has tried nothing for the symptoms.             Review of Systems   Constitutional: Negative for chills and fever.   HENT: Negative for congestion and rhinorrhea.    Respiratory: Negative for cough.    Gastrointestinal: Positive for change in bowel habit and diarrhea. Negative for abdominal pain, nausea and vomiting.   Genitourinary: Negative for decreased urine volume, difficulty urinating and dysuria.   Neurological: Positive for dizziness and headaches. Negative for numbness.            Past Medical History:   Diagnosis Date   • Abnormal ECG    • Alzheimer disease (HCC)    • Colon polyp    • GERD (gastroesophageal reflux disease)    • Hypertension    • Lipoma 07/17/2014    LLQ   • Shoulder pain     Right   • Tendonitis  "of shoulder 07/10/2014            Past Surgical History:   Procedure Laterality Date   • COLON SURGERY     • COLONOSCOPY     • KNEE SURGERY     • KNEE SURGERY     • SHOULDER SURGERY     • SHOULDER SURGERY     • TONSILLECTOMY              Family History   Problem Relation Age of Onset   • Alzheimer's disease Mother    • Heart disease Mother    • Cancer Father         Unspecified - mouth ca   • Colon cancer Father             Social History     Socioeconomic History   • Marital status:    Tobacco Use   • Smoking status: Never Smoker   • Smokeless tobacco: Never Used   Vaping Use   • Vaping Use: Never used   Substance and Sexual Activity   • Alcohol use: Never   • Drug use: Never   • Sexual activity: Defer            No Known Allergies         Current Outpatient Medications on File Prior to Visit   Medication Sig Dispense Refill   • amLODIPine (NORVASC) 2.5 MG tablet Take 1 tablet by mouth Every Night. 30 tablet 1   • aspirin 81 MG EC tablet Take 81 mg by mouth Daily.     • citalopram (CeleXA) 20 MG tablet Take 1 tablet by mouth Daily. (Patient taking differently: Take 10 mg by mouth Daily.) 90 tablet 1   • cyanocobalamin (VITAMIN B-12) 250 MCG tablet Take 1 tablet by mouth Daily. 90 each 1   • memantine (NAMENDA) 10 MG tablet Take 10 mg by mouth 2 (Two) Times a Day.     • metoprolol succinate XL (TOPROL-XL) 25 MG 24 hr tablet Take 1 tablet by mouth Daily. 90 tablet 3   • [DISCONTINUED] lisinopril (PRINIVIL,ZESTRIL) 5 MG tablet Take 1 tablet by mouth Daily. 90 tablet 3     No current facility-administered medications on file prior to visit.            /70 (BP Location: Right arm, Patient Position: Sitting, Cuff Size: Adult)   Pulse 58   Temp 97.6 °F (36.4 °C) (Oral)   Ht 167.6 cm (65.98\")   Wt 61.2 kg (135 lb)   SpO2 98% Comment: room air  BMI 21.80 kg/m²          Objective     Physical Exam  Vitals and nursing note reviewed.   Constitutional:       General: He is not in acute distress.     " Appearance: Normal appearance.   HENT:      Head: Normocephalic and atraumatic.      Right Ear: Tympanic membrane, ear canal and external ear normal.      Left Ear: Tympanic membrane and external ear normal.      Mouth/Throat:      Mouth: Mucous membranes are moist.   Eyes:      Extraocular Movements: Extraocular movements intact.      Conjunctiva/sclera: Conjunctivae normal.      Pupils: Pupils are equal, round, and reactive to light.   Cardiovascular:      Rate and Rhythm: Normal rate and regular rhythm.      Heart sounds: Normal heart sounds.   Pulmonary:      Effort: Pulmonary effort is normal. No respiratory distress.      Breath sounds: Normal breath sounds.   Skin:     General: Skin is warm and dry.   Neurological:      Mental Status: He is alert.      Motor: No weakness.      Gait: Gait normal.      Comments: Did not have dizziness during visit today.   Psychiatric:         Mood and Affect: Mood normal.         Behavior: Behavior normal.              Procedures                    Lab Results (last 24 hours)     ** No results found for the last 24 hours. **                No Radiology Exams Resulted Within Past 24 Hours                    Diagnoses and all orders for this visit:    1. Diarrhea, unspecified type (Primary)  Comments:  Stool studies ordered. Encouraged increased fluids. To ER if symptoms worsening or concern of dehydration.   Orders:  -     Comprehensive metabolic panel; Future  -     Enteric Bacterial Panel - Stool, Per Rectum; Future  -     Enteric Parasite Panel - Stool, Per Rectum; Future  -     Occult Blood X 1, Stool - Stool, Per Rectum; Future  -     Clostridium Difficile Toxin, PCR - Stool, Per Rectum; Future  -     CBC and differential; Future    2. Dizziness  Comments:  Chart review seems like this has been an ongoing issue for some time. Exam and vitals reassuring. Will check electrolyte levels today. FU with PCP as scheduled.  Orders:  -     Comprehensive metabolic panel; Future  -      CBC and differential; Future             Additional Instructions for the Follow-ups that You Need to Schedule     Clostridium Difficile Toxin, PCR - Stool, Per Rectum    Jul 22, 2022 (Approximate)      No results found for: CDIFF    Do NOT Order:    - Patient With Formed Stools    - Negative Test Result Within 7 Days    - Positive Test Result Within 21 Days    - To Monitor Effectiveness of Treatment         Repeat Stool Assays Are NOT Warranted    Release to patient: Immediate         Enteric Bacterial Panel - Stool, Per Rectum    Jul 22, 2022 (Approximate)      Release to patient: Immediate         Enteric Parasite Panel - Stool, Per Rectum    Jul 22, 2022 (Approximate)      Release to patient: Immediate         Occult Blood X 1, Stool - Stool, Per Rectum    Jul 22, 2022 (Approximate)      Is this occult blood testing for Screening purposes?: No    Release to patient: Immediate         CBC and differential    Jul 27, 2022 (Approximate)      Manual Differential: No    Release to patient: Immediate         Comprehensive metabolic panel    Jul 27, 2022 (Approximate)      Release to patient: Immediate                         FOR FULL DISCHARGE INSTRUCTIONS/COMMENTS/HANDOUTS please see the AVS

## 2022-08-02 DIAGNOSIS — G30.1 LATE ONSET ALZHEIMER'S DEMENTIA WITHOUT BEHAVIORAL DISTURBANCE: ICD-10-CM

## 2022-08-02 DIAGNOSIS — F02.80 LATE ONSET ALZHEIMER'S DEMENTIA WITHOUT BEHAVIORAL DISTURBANCE: ICD-10-CM

## 2022-08-02 RX ORDER — CITALOPRAM 10 MG/1
TABLET ORAL
Qty: 90 TABLET | Refills: 0 | Status: SHIPPED | OUTPATIENT
Start: 2022-08-02 | End: 2022-09-09

## 2022-08-03 ENCOUNTER — TELEPHONE (OUTPATIENT)
Dept: FAMILY MEDICINE CLINIC | Age: 82
End: 2022-08-03

## 2022-08-09 ENCOUNTER — LAB (OUTPATIENT)
Dept: LAB | Facility: HOSPITAL | Age: 82
End: 2022-08-09

## 2022-08-09 ENCOUNTER — TRANSCRIBE ORDERS (OUTPATIENT)
Dept: ADMINISTRATIVE | Facility: HOSPITAL | Age: 82
End: 2022-08-09

## 2022-08-09 DIAGNOSIS — N18.30 STAGE 3 CHRONIC KIDNEY DISEASE, UNSPECIFIED WHETHER STAGE 3A OR 3B CKD: ICD-10-CM

## 2022-08-09 DIAGNOSIS — I10 ESSENTIAL HYPERTENSION, MALIGNANT: ICD-10-CM

## 2022-08-09 DIAGNOSIS — N18.30 STAGE 3 CHRONIC KIDNEY DISEASE, UNSPECIFIED WHETHER STAGE 3A OR 3B CKD: Primary | ICD-10-CM

## 2022-08-09 LAB
ALBUMIN SERPL-MCNC: 3.8 G/DL (ref 3.5–5.2)
ALBUMIN/GLOB SERPL: 1.7 G/DL
ALP SERPL-CCNC: 73 U/L (ref 39–117)
ALT SERPL W P-5'-P-CCNC: 9 U/L (ref 1–41)
ANION GAP SERPL CALCULATED.3IONS-SCNC: 7.7 MMOL/L (ref 5–15)
AST SERPL-CCNC: 12 U/L (ref 1–40)
BASOPHILS # BLD AUTO: 0.03 10*3/MM3 (ref 0–0.2)
BASOPHILS NFR BLD AUTO: 0.5 % (ref 0–1.5)
BILIRUB SERPL-MCNC: 0.5 MG/DL (ref 0–1.2)
BUN SERPL-MCNC: 25 MG/DL (ref 8–23)
BUN/CREAT SERPL: 12.4 (ref 7–25)
CALCIUM SPEC-SCNC: 9 MG/DL (ref 8.6–10.5)
CHLORIDE SERPL-SCNC: 104 MMOL/L (ref 98–107)
CO2 SERPL-SCNC: 25.3 MMOL/L (ref 22–29)
CREAT SERPL-MCNC: 2.01 MG/DL (ref 0.76–1.27)
DEPRECATED RDW RBC AUTO: 44.2 FL (ref 37–54)
EGFRCR SERPLBLD CKD-EPI 2021: 32.5 ML/MIN/1.73
EOSINOPHIL # BLD AUTO: 0.12 10*3/MM3 (ref 0–0.4)
EOSINOPHIL NFR BLD AUTO: 2 % (ref 0.3–6.2)
ERYTHROCYTE [DISTWIDTH] IN BLOOD BY AUTOMATED COUNT: 12.3 % (ref 12.3–15.4)
GLOBULIN UR ELPH-MCNC: 2.2 GM/DL
GLUCOSE SERPL-MCNC: 108 MG/DL (ref 65–99)
HCT VFR BLD AUTO: 35.6 % (ref 37.5–51)
HGB BLD-MCNC: 11 G/DL (ref 13–17.7)
IMM GRANULOCYTES # BLD AUTO: 0.02 10*3/MM3 (ref 0–0.05)
IMM GRANULOCYTES NFR BLD AUTO: 0.3 % (ref 0–0.5)
LYMPHOCYTES # BLD AUTO: 1.54 10*3/MM3 (ref 0.7–3.1)
LYMPHOCYTES NFR BLD AUTO: 25.4 % (ref 19.6–45.3)
MCH RBC QN AUTO: 30 PG (ref 26.6–33)
MCHC RBC AUTO-ENTMCNC: 30.9 G/DL (ref 31.5–35.7)
MCV RBC AUTO: 97 FL (ref 79–97)
MONOCYTES # BLD AUTO: 0.5 10*3/MM3 (ref 0.1–0.9)
MONOCYTES NFR BLD AUTO: 8.3 % (ref 5–12)
NEUTROPHILS NFR BLD AUTO: 3.85 10*3/MM3 (ref 1.7–7)
NEUTROPHILS NFR BLD AUTO: 63.5 % (ref 42.7–76)
PLATELET # BLD AUTO: 169 10*3/MM3 (ref 140–450)
PMV BLD AUTO: 11.1 FL (ref 6–12)
POTASSIUM SERPL-SCNC: 4 MMOL/L (ref 3.5–5.2)
PROT SERPL-MCNC: 6 G/DL (ref 6–8.5)
RBC # BLD AUTO: 3.67 10*6/MM3 (ref 4.14–5.8)
SODIUM SERPL-SCNC: 137 MMOL/L (ref 136–145)
WBC NRBC COR # BLD: 6.06 10*3/MM3 (ref 3.4–10.8)

## 2022-08-09 PROCEDURE — 36415 COLL VENOUS BLD VENIPUNCTURE: CPT

## 2022-08-09 PROCEDURE — 80053 COMPREHEN METABOLIC PANEL: CPT

## 2022-08-09 PROCEDURE — 85025 COMPLETE CBC W/AUTO DIFF WBC: CPT

## 2022-09-09 ENCOUNTER — OFFICE VISIT (OUTPATIENT)
Dept: FAMILY MEDICINE CLINIC | Age: 82
End: 2022-09-09

## 2022-09-09 VITALS
WEIGHT: 131.8 LBS | DIASTOLIC BLOOD PRESSURE: 78 MMHG | HEART RATE: 53 BPM | TEMPERATURE: 97.6 F | SYSTOLIC BLOOD PRESSURE: 126 MMHG | BODY MASS INDEX: 21.18 KG/M2 | HEIGHT: 66 IN

## 2022-09-09 DIAGNOSIS — N18.32 CHRONIC KIDNEY DISEASE, STAGE 3B: ICD-10-CM

## 2022-09-09 DIAGNOSIS — I10 ESSENTIAL HYPERTENSION: ICD-10-CM

## 2022-09-09 DIAGNOSIS — Z23 ENCOUNTER FOR IMMUNIZATION: ICD-10-CM

## 2022-09-09 DIAGNOSIS — F02.818 LATE ONSET ALZHEIMER'S DEMENTIA WITH BEHAVIORAL DISTURBANCE: ICD-10-CM

## 2022-09-09 DIAGNOSIS — S21.91XA LACERATION OF CHEST, INITIAL ENCOUNTER: ICD-10-CM

## 2022-09-09 DIAGNOSIS — R07.9 CHEST PAIN, UNSPECIFIED TYPE: ICD-10-CM

## 2022-09-09 DIAGNOSIS — Z79.899 OTHER LONG TERM (CURRENT) DRUG THERAPY: ICD-10-CM

## 2022-09-09 DIAGNOSIS — H02.9 EYELID LESION: ICD-10-CM

## 2022-09-09 DIAGNOSIS — D51.0 VITAMIN B12 DEFICIENCY ANEMIA DUE TO INTRINSIC FACTOR DEFICIENCY: ICD-10-CM

## 2022-09-09 DIAGNOSIS — D64.9 ANEMIA, UNSPECIFIED TYPE: ICD-10-CM

## 2022-09-09 DIAGNOSIS — G30.1 LATE ONSET ALZHEIMER'S DEMENTIA WITH BEHAVIORAL DISTURBANCE: ICD-10-CM

## 2022-09-09 DIAGNOSIS — Z00.00 PHYSICAL EXAM: Primary | ICD-10-CM

## 2022-09-09 PROCEDURE — G0439 PPPS, SUBSEQ VISIT: HCPCS | Performed by: FAMILY MEDICINE

## 2022-09-09 PROCEDURE — 1126F AMNT PAIN NOTED NONE PRSNT: CPT | Performed by: FAMILY MEDICINE

## 2022-09-09 PROCEDURE — 99214 OFFICE O/P EST MOD 30 MIN: CPT | Performed by: FAMILY MEDICINE

## 2022-09-09 PROCEDURE — 90471 IMMUNIZATION ADMIN: CPT | Performed by: FAMILY MEDICINE

## 2022-09-09 PROCEDURE — G0009 ADMIN PNEUMOCOCCAL VACCINE: HCPCS | Performed by: FAMILY MEDICINE

## 2022-09-09 PROCEDURE — 90714 TD VACC NO PRESV 7 YRS+ IM: CPT | Performed by: FAMILY MEDICINE

## 2022-09-09 PROCEDURE — 1170F FXNL STATUS ASSESSED: CPT | Performed by: FAMILY MEDICINE

## 2022-09-09 PROCEDURE — 90677 PCV20 VACCINE IM: CPT | Performed by: FAMILY MEDICINE

## 2022-09-09 PROCEDURE — 1159F MED LIST DOCD IN RCRD: CPT | Performed by: FAMILY MEDICINE

## 2022-09-09 PROCEDURE — 93000 ELECTROCARDIOGRAM COMPLETE: CPT | Performed by: FAMILY MEDICINE

## 2022-09-09 RX ORDER — MEMANTINE HYDROCHLORIDE 10 MG/1
10 TABLET ORAL 2 TIMES DAILY
Qty: 180 TABLET | Refills: 1 | Status: SHIPPED | OUTPATIENT
Start: 2022-09-09

## 2022-09-09 RX ORDER — METOPROLOL SUCCINATE 25 MG/1
25 TABLET, EXTENDED RELEASE ORAL DAILY
Qty: 90 TABLET | Refills: 1 | Status: SHIPPED | OUTPATIENT
Start: 2022-09-09

## 2022-09-09 RX ORDER — FERROUS SULFATE 325(65) MG
325 TABLET ORAL 2 TIMES DAILY
COMMUNITY
Start: 2022-08-16 | End: 2022-10-15

## 2022-09-09 RX ORDER — CITALOPRAM 20 MG/1
20 TABLET ORAL DAILY
Qty: 90 TABLET | Refills: 1 | Status: SHIPPED | OUTPATIENT
Start: 2022-09-09

## 2022-09-09 NOTE — PROGRESS NOTES
The ABCs of the Annual Wellness Visit  Subsequent Medicare Wellness Visit    Chief Complaint:  Medicare wellness exam, dementia, blood pressure    Subjective    History of Present Illness:  Teofilo Gomez is a 81 y.o. male who presents for a Subsequent Medicare Wellness Visit.    The following portions of the patient's history were reviewed and updated as appropriate: allergies, current medications, past family history, past medical history, past social history, past surgical history and problem list.     Compared to one year ago, the patient feels his physical health is the same.  His wife does not necessarily agree with this.    Compared to one year ago, the patient feels his mental health is the same.    Recent Hospitalizations:  He was not admitted to the hospital during the last year.       Current Medical Providers:  Patient Care Team:  Mohinder Hutchison MD as PCP - General (Family Medicine)  Corey Grullon MD as Consulting Physician (Neurology)    Outpatient Medications Prior to Visit   Medication Sig Dispense Refill   • cyanocobalamin (VITAMIN B-12) 250 MCG tablet Take 1 tablet by mouth Daily. 90 each 1   • ferrous sulfate 325 (65 FE) MG tablet Take 325 mg by mouth 2 (Two) Times a Day.     • citalopram (CeleXA) 10 MG tablet Take 1 tablet by mouth once daily 90 tablet 0   • memantine (NAMENDA) 10 MG tablet Take 10 mg by mouth 2 (Two) Times a Day.     • metoprolol succinate XL (TOPROL-XL) 25 MG 24 hr tablet Take 1 tablet by mouth Daily. 90 tablet 3   • amLODIPine (NORVASC) 2.5 MG tablet Take 1 tablet by mouth Every Night. 30 tablet 1   • aspirin 81 MG EC tablet Take 81 mg by mouth Daily.       No facility-administered medications prior to visit.       No opioid medication identified on active medication list. I have reviewed chart for other potential  high risk medication/s and harmful drug interactions in the elderly.          Aspirin is not on active medication list.  Aspirin use is not  "indicated based on review of current medical condition/s. Risk of harm outweighs potential benefits.  .    Patient Active Problem List   Diagnosis   • Precordial pain   • Zenker's diverticulum   • Behavioral change   • Alzheimer's dementia with behavioral disturbance (HCC)   • Essential hypertension   • Vitamin B12 deficiency anemia due to intrinsic factor deficiency   • Chronic kidney disease, stage 3b (HCC)   • Other long term (current) drug therapy   • Syncope and collapse     Advance Care Planning   Advance Directive is not on file.  ACP discussion was held with the patient during this visit. Patient does not have an advance directive, information provided.  His wife, Sara, would make decisions if needed.    Francisco apparently fell recently.  He is unsure what caused the fall.  He did suffer a small laceration to the upper portion of the chest.  They do not think he has had a recent tetanus booster.    He also has significant dementia for which she remains on medications as noted.  He was not able to tolerate generic Aricept.  He is able to tolerate memantine fairly well.  He is not driving currently.    He also has hypertension and some degree of chronic renal insufficiency.  His blood pressure is well controlled today.  His recent kidney numbers were moderately elevated.    Review of Systems:  Review of Systems   Constitutional: Negative for chills and fever.   Respiratory: Negative for cough and shortness of breath.    Cardiovascular: Positive for chest pain (He was complaining about this yesterday per his wife.). Negative for palpitations.   Gastrointestinal: Negative for abdominal pain, nausea and vomiting.         Objective       Vitals:    09/09/22 0851   BP: 126/78   BP Location: Left arm   Patient Position: Sitting   Pulse: 53   Temp: 97.6 °F (36.4 °C)   TempSrc: Oral   Weight: 59.8 kg (131 lb 12.8 oz)   Height: 167.6 cm (65.98\")   PainSc: 0-No pain     BMI Readings from Last 1 Encounters:   09/09/22 " 21.28 kg/m²   BMI is within normal parameters. No follow-up required.    Does the patient have evidence of cognitive impairment? Yes    Physical Exam  Vitals and nursing note reviewed.   Constitutional:       General: He is not in acute distress.     Appearance: He is not ill-appearing.   HENT:      Right Ear: Tympanic membrane and ear canal normal.      Left Ear: Tympanic membrane and ear canal normal.      Ears:      Comments: Hearing is normal with forced whisper bilaterally.     Mouth/Throat:      Mouth: Mucous membranes are moist.      Comments: Pharynx appears normal  Eyes:      Extraocular Movements: Extraocular movements intact.      Pupils: Pupils are equal, round, and reactive to light.      Comments: Binocular vision is 20/20 with correction.   Neck:      Thyroid: No thyromegaly.   Cardiovascular:      Rate and Rhythm: Normal rate and regular rhythm.      Heart sounds: No murmur heard.  Pulmonary:      Effort: Pulmonary effort is normal.      Breath sounds: Normal breath sounds.   Abdominal:      General: There is no distension.      Palpations: Abdomen is soft. There is no mass.      Tenderness: There is no abdominal tenderness.   Musculoskeletal:      Cervical back: Normal range of motion.   Skin:     Findings: Lesion (there is thickened skin lesion of the R upper eyelid) present. No rash.      Comments: There is an area of abrasion and skin tear on the anterior chest.  There are 2-3 separate areas that appear to have been slightly injured.  Some mild redness is present.  No obvious abscess is apparent.   Neurological:      Mental Status: Mental status is at baseline. He is disoriented.      Cranial Nerves: No cranial nerve deficit.      Motor: No weakness.   Psychiatric:         Mood and Affect: Affect is flat.         Cognition and Memory: Cognition is impaired.       The following data was reviewed by Mohinder Hutchison MD on 09/09/2022.  Lab Results   Component Value Date    WBC 6.06 08/09/2022     HGB 11.0 (L) 08/09/2022    HCT 35.6 (L) 08/09/2022    MCV 97.0 08/09/2022     08/09/2022     Lab Results   Component Value Date    GLUCOSE 108 (H) 08/09/2022    BUN 25 (H) 08/09/2022    CREATININE 2.01 (H) 08/09/2022     08/09/2022    K 4.0 08/09/2022     08/09/2022    CO2 25.3 08/09/2022    CALCIUM 9.0 08/09/2022    PROTEINTOT 6.0 08/09/2022    ALBUMIN 3.80 08/09/2022    ALT 9 08/09/2022    AST 12 08/09/2022    ALKPHOS 73 08/09/2022    BILITOT 0.5 08/09/2022    GLOB 2.2 08/09/2022    AGRATIO 1.7 08/09/2022    BCR 12.4 08/09/2022    ANIONGAP 7.7 08/09/2022      No results found for: CHOL, CHLPL, TRIG, HDL, LDL, LDLDIRECT  Lab Results   Component Value Date    TSH 2.900 03/04/2022     No results found for: HGBA1C  Lab Results   Component Value Date    PSA 2.28 05/28/2021    PSA 2.57 07/12/2019          HEALTH RISK ASSESSMENT    Smoking Status:  Social History     Tobacco Use   Smoking Status Never Smoker   Smokeless Tobacco Never Used     Alcohol Consumption:  Social History     Substance and Sexual Activity   Alcohol Use Never     Fall Risk Screen:    RAVIN Fall Risk Assessment was completed, and patient is at HIGH risk for falls. Assessment completed on:3/16/2022    Depression Screening:  PHQ-2/PHQ-9 Depression Screening 3/4/2022   Retired PHQ-9 Total Score 0   Retired Total Score 0       Health Habits and Functional and Cognitive Screening:  Functional & Cognitive Status 9/9/2022   Do you have difficulty preparing food and eating? Yes   Do you have difficulty bathing yourself, getting dressed or grooming yourself? Yes   Do you have difficulty using the toilet? No   Do you have difficulty moving around from place to place? Yes   Do you have trouble with steps or getting out of a bed or a chair? Yes   Current Diet Well Balanced Diet   Dental Exam Not up to date   Eye Exam Not up to date   Exercise (times per week) 0 times per week   Current Exercises Include No Regular Exercise   Do you need  help using the phone?  No   Are you deaf or do you have serious difficulty hearing?  No   Do you need help with transportation? Yes   Do you need help shopping? Yes   Do you need help preparing meals?  Yes   Do you need help with housework?  Yes   Do you need help with laundry? Yes   Do you need help taking your medications? No   Do you need help managing money? Yes   Do you ever drive or ride in a car without wearing a seat belt? No   Have you felt unusual stress, anger or loneliness in the last month? No   Who do you live with? Spouse   If you need help, do you have trouble finding someone available to you? No   Have you been bothered in the last four weeks by sexual problems? No   Do you have difficulty concentrating, remembering or making decisions? Yes       Age-appropriate Screening Schedule:  Refer to the list below for future screening recommendations based on patient's age, sex and/or medical conditions. Orders for these recommended tests are listed in the plan section. The patient has been provided with a written plan.    Health Maintenance   Topic Date Due   • TDAP/TD VACCINES (1 - Tdap) Never done   • ZOSTER VACCINE (1 of 2) Never done   • INFLUENZA VACCINE  10/01/2022                ECG 12 Lead    Date/Time: 9/9/2022 9:38 AM  Performed by: Mohinder Hutchison MD  Authorized by: Mohinder Hutchison MD   Comparison: compared with previous ECG from 5/28/2021  Similar to previous ECG  Rhythm: sinus bradycardia  Rate: bradycardic  BPM: 50  Conduction: right bundle branch block  Conduction comments: Short OH interval  ST Segments: ST segments normal  T inversion: III and aVF  QRS axis: normal  Other findings: T wave abnormality    Clinical impression: abnormal EKG  Clinical impression comment: This is an abnormal EKG showing sinus bradycardia and nonspecific T wave abnormality.  There is no acute T wave change compared to the most recent exam though.                  Assessment and Plan:       CMS  Preventative Services Quick Reference  Risk Factors Identified During Encounter  Cardiovascular Disease  Dementia/Memory   Depression/Dysphoria  Immunizations Discussed/Encouraged (specific Immunizations; Tdap, Influenza, Prevnar 20 (Pneumococcal 20-valent conjugate), Shingrix and COVID19    The above risks/problems have been discussed with the patient.  Follow up actions/plans if indicated are seen below in the Assessment/Plan Section.  Pertinent information has been shared with the patient in the After Visit Summary.    Today, we have reviewed his care.  With regard to the wellness exam, he is mostly up-to-date on screenings.  However, there are some vaccines that would normally be recommended.  We will give a tetanus vaccine in part because of the laceration and abrasion.  I would also recommend he consider a flu shot and COVID booster when they are available.  He may want to check with his pharmacy about the shingles vaccine.    With regard to his usual care, no changes are anticipated.  We will refill needed medications.  His recent labs have been reviewed.  I am going to move ahead with an EKG as a precaution.  He apparently has had some difficulty recently with chest tightness with exertion.  We will see what that shows and be back in touch with him.  Also, this is a fairly large lesion on his right eyelid.  I suspect it is benign, but I do think it would be wise for oculoplastics to look at it.  We will make referral in this regard.    Diagnoses and all orders for this visit:    1. Physical exam (Primary)    2. Late onset Alzheimer's dementia with behavioral disturbance (HCC)  -     citalopram (CeleXA) 20 MG tablet; Take 1 tablet by mouth Daily.  Dispense: 90 tablet; Refill: 1  -     memantine (NAMENDA) 10 MG tablet; Take 1 tablet by mouth 2 (Two) Times a Day.  Dispense: 180 tablet; Refill: 1    3. Essential hypertension  -     metoprolol succinate XL (TOPROL-XL) 25 MG 24 hr tablet; Take 1 tablet by mouth  Daily.  Dispense: 90 tablet; Refill: 1    4. Vitamin B12 deficiency anemia due to intrinsic factor deficiency  Comments:  Continue current B12 supplement.    5. Chronic kidney disease, stage 3b (HCC)  Comments:  Continue follow-up with nephrology.    6. Other long term (current) drug therapy  Comments:  As above.    7. Laceration of chest, initial encounter  -     Td Vaccine Greater Than or Equal To 6yo With Preservative IM    8. Anemia, unspecified type  Comments:  Likely due to chronic disease.    9. Eyelid lesion  -     Ambulatory Referral to Ophthalmology    10. Chest pain, unspecified type  -     ECG 12 Lead    11. Encounter for immunization  -     Td Vaccine Greater Than or Equal To 6yo With Preservative IM  -     Pneumococcal Conjugate Vaccine 20-Valent (PCV20)        Follow Up:   Return in about 6 months (around 3/9/2023) for Recheck.     An After Visit Summary and PPPS were given to the patient.

## 2022-09-10 NOTE — PROGRESS NOTES
Please let Sara know that her Francisco's EKG looks okay.  There is not any new finding of concern.  His heart rate is somewhat slow on it though.  It could be increasing fatigue or possibly even decreasing stamina.  Confirm his current dose of metoprolol.  If he is taking a whole tablet daily, then I would recommend decreasing it to 1/2 tablet daily at this time.  I also reviewed the stress test and echocardiogram from last year.  I do not have a strong feeling that we need to refer him back to cardiology at this point.  We could consider that though if he has ongoing difficulty.  Let me know if Sara has additional concerns.  Thanks.

## 2022-11-13 DIAGNOSIS — G30.1 LATE ONSET ALZHEIMER'S DEMENTIA WITHOUT BEHAVIORAL DISTURBANCE: ICD-10-CM

## 2022-11-13 DIAGNOSIS — F02.80 LATE ONSET ALZHEIMER'S DEMENTIA WITHOUT BEHAVIORAL DISTURBANCE: ICD-10-CM

## 2022-11-14 RX ORDER — CITALOPRAM 10 MG/1
TABLET ORAL
Qty: 90 TABLET | Refills: 0 | OUTPATIENT
Start: 2022-11-14